# Patient Record
Sex: FEMALE | Race: WHITE | NOT HISPANIC OR LATINO | Employment: OTHER | URBAN - METROPOLITAN AREA
[De-identification: names, ages, dates, MRNs, and addresses within clinical notes are randomized per-mention and may not be internally consistent; named-entity substitution may affect disease eponyms.]

---

## 2017-01-09 ENCOUNTER — OFFICE VISIT (OUTPATIENT)
Dept: FAMILY MEDICINE | Facility: CLINIC | Age: 77
End: 2017-01-09
Payer: COMMERCIAL

## 2017-01-09 VITALS
TEMPERATURE: 98.2 F | DIASTOLIC BLOOD PRESSURE: 96 MMHG | HEART RATE: 86 BPM | OXYGEN SATURATION: 96 % | SYSTOLIC BLOOD PRESSURE: 150 MMHG

## 2017-01-09 DIAGNOSIS — F33.0 MAJOR DEPRESSIVE DISORDER, RECURRENT EPISODE, MILD (H): Primary | ICD-10-CM

## 2017-01-09 DIAGNOSIS — M25.50 PAIN IN JOINT, MULTIPLE SITES: ICD-10-CM

## 2017-01-09 LAB
CRP SERPL-MCNC: 6.1 MG/L (ref 0–8)
ERYTHROCYTE [SEDIMENTATION RATE] IN BLOOD BY WESTERGREN METHOD: 34 MM/H (ref 0–30)

## 2017-01-09 PROCEDURE — 86038 ANTINUCLEAR ANTIBODIES: CPT | Mod: 90 | Performed by: NURSE PRACTITIONER

## 2017-01-09 PROCEDURE — 99214 OFFICE O/P EST MOD 30 MIN: CPT | Performed by: NURSE PRACTITIONER

## 2017-01-09 PROCEDURE — 36415 COLL VENOUS BLD VENIPUNCTURE: CPT | Performed by: NURSE PRACTITIONER

## 2017-01-09 PROCEDURE — 86140 C-REACTIVE PROTEIN: CPT | Mod: 90 | Performed by: NURSE PRACTITIONER

## 2017-01-09 PROCEDURE — 85652 RBC SED RATE AUTOMATED: CPT | Performed by: NURSE PRACTITIONER

## 2017-01-09 PROCEDURE — 99000 SPECIMEN HANDLING OFFICE-LAB: CPT | Performed by: NURSE PRACTITIONER

## 2017-01-09 PROCEDURE — 86431 RHEUMATOID FACTOR QUANT: CPT | Mod: 90 | Performed by: NURSE PRACTITIONER

## 2017-01-09 ASSESSMENT — ANXIETY QUESTIONNAIRES
GAD7 TOTAL SCORE: 6
IF YOU CHECKED OFF ANY PROBLEMS ON THIS QUESTIONNAIRE, HOW DIFFICULT HAVE THESE PROBLEMS MADE IT FOR YOU TO DO YOUR WORK, TAKE CARE OF THINGS AT HOME, OR GET ALONG WITH OTHER PEOPLE: NOT DIFFICULT AT ALL
2. NOT BEING ABLE TO STOP OR CONTROL WORRYING: SEVERAL DAYS
1. FEELING NERVOUS, ANXIOUS, OR ON EDGE: MORE THAN HALF THE DAYS
6. BECOMING EASILY ANNOYED OR IRRITABLE: NOT AT ALL
3. WORRYING TOO MUCH ABOUT DIFFERENT THINGS: SEVERAL DAYS
7. FEELING AFRAID AS IF SOMETHING AWFUL MIGHT HAPPEN: SEVERAL DAYS
5. BEING SO RESTLESS THAT IT IS HARD TO SIT STILL: NOT AT ALL

## 2017-01-09 ASSESSMENT — PATIENT HEALTH QUESTIONNAIRE - PHQ9: 5. POOR APPETITE OR OVEREATING: SEVERAL DAYS
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
## 2017-01-09 NOTE — Clinical Note
[facility]   [address], [place]  [address]  [phone]      January 12, 2017      Roxana Karimi  [address]  [facility] [address]              Dear Ms. Karimi,    Tests for rheumatoid arthritis were negative (normal).    Results for orders placed or performed in visit on 01/09/17   Rheumatoid factor   Result Value Ref Range    Rheumatoid Factor <20 <20 IU/mL   Antinuclear antibody screen by EIA   Result Value Ref Range    LUIS MANUEL Screen by EIA <1.0  Interpretation:  Negative   <1.0   CRP inflammation   Result Value Ref Range    CRP Inflammation 6.1 0.0 - 8.0 mg/L   ESR: Erythrocyte sedimentation rate   Result Value Ref Range    Sed Rate 34 (H) 0 - 30 mm/h           Sincerely,    Gloria Akbar, DARON/nr

## 2017-01-09 NOTE — NURSING NOTE
"Chief Complaint   Patient presents with     Depression     Musculoskeletal Problem       Initial There were no vitals taken for this visit. Estimated body mass index is 36.15 kg/(m^2) as calculated from the following:    Height as of 7/13/14: 5' 3\" (1.6 m).    Weight as of 10/6/13: 204 lb (92.534 kg).  BP completed using cuff size: donnie Montoya CMA   "

## 2017-01-09 NOTE — MR AVS SNAPSHOT
"              After Visit Summary   2017    Roxana Karimi    MRN: [id number]           Patient Information     Date Of Birth          1940        Visit Information        Provider Department      2017 1:45 PM Gloria Akbar NP [facility]        Today's Diagnoses     Major depressive disorder, recurrent episode, mild (H)    -  1     Pain in joint, multiple sites            Follow-ups after your visit        Who to contact     If you have questions or need follow up information about today's clinic visit or your schedule please contact [facility] directly at [phone].  Normal or non-critical lab and imaging results will be communicated to you by [employer], letter or phone within 4 business days after the clinic has received the results. If you do not hear from us within 7 days, please contact the clinic through [employer] or phone. If you have a critical or abnormal lab result, we will notify you by phone as soon as possible.  Submit refill requests through [employer] or call your pharmacy and they will forward the refill request to us. Please allow 3 business days for your refill to be completed.          Additional Information About Your Visit        MyChart Information     [employer] lets you send messages to your doctor, view your test results, renew your prescriptions, schedule appointments and more. To sign up, go to [email or web address] . Click on \"Log in\" on the left side of the screen, which will take you to the Welcome page. Then click on \"Sign up Now\" on the right side of the page.     You will be asked to enter the access code listed below, as well as some personal information. Please follow the directions to create your username and password.     Your access code is: [email or web address]  Expires: 2017  2:53 PM     Your access code will  in 90 days. If you need help or a new code, please call your [facility] or [phone].        Care " EveryWhere ID     This is your Care EveryWhere ID. This could be used by other organizations to access your [place] medical records  [id number]        Your Vitals Were     Pulse Temperature Pulse Oximetry Breastfeeding?          86 98.2  F (36.8  C) (Oral) 96% No         Blood Pressure from Last 3 Encounters:   01/09/17 150/96   04/20/16 138/78   01/04/16 158/90    Weight from Last 3 Encounters:   10/06/13 204 lb (92.534 kg)   05/28/13 208 lb 3.2 oz (94.439 kg)   04/25/13 204 lb 3.2 oz (92.625 kg)              We Performed the Following     Antinuclear antibody screen by EIA     CRP inflammation     ESR: Erythrocyte sedimentation rate     Rheumatoid factor        Primary Care Provider Office Phone # Fax #    Gloria Akbar [phone]       [facility] [address]  [facility] [address]        Thank you!     Thank you for choosing [facility]  for your care. Our goal is always to provide you with excellent care. Hearing back from our patients is one way we can continue to improve our services. Please take a few minutes to complete the written survey that you may receive in the mail after your visit with us. Thank you!             Your Updated Medication List - Protect others around you: Learn how to safely use, store and throw away your medicines at www.disposemymeds.org.          This list is accurate as of: 1/9/17  2:53 PM.  Always use your most recent med list.                   Brand Name Dispense Instructions for use    aspirin 81 MG tablet      1 TABLET DAILY       GLUCOSAMINE CHONDROITIN COMPLX PO          IBUPROFEN PO      Take 400 mg by mouth every 4 hours as needed for moderate pain       magnesium 250 MG tablet      Take 1 tablet by mouth At Bedtime       OMEGA-3 FISH OIL PO      Take 1 g by mouth daily       order for DME     1 Units    Equipment being ordered: zipper Jobst or other compression stockings       TURMERIC PO          TYLENOL PO       Take by mouth as needed for mild pain or fever       vitamin B complex with vitamin C Tabs tablet      Take 1 tablet by mouth daily       VITAMIN D (CHOLECALCIFEROL) PO      Take 5,000 Units by mouth daily

## 2017-01-10 LAB
ANA SER QL IA: NORMAL
RHEUMATOID FACT SER NEPH-ACNC: <20 IU/ML (ref 0–20)

## 2017-01-10 ASSESSMENT — ANXIETY QUESTIONNAIRES: GAD7 TOTAL SCORE: 6

## 2017-01-10 ASSESSMENT — PATIENT HEALTH QUESTIONNAIRE - PHQ9: SUM OF ALL RESPONSES TO PHQ QUESTIONS 1-9: 13

## 2017-01-30 ENCOUNTER — TELEPHONE (OUTPATIENT)
Dept: FAMILY MEDICINE | Facility: CLINIC | Age: 77
End: 2017-01-30

## 2017-01-30 NOTE — TELEPHONE ENCOUNTER
I would recommend trying Delsym.  If this is not helping enough with the cough at night, she can let us know.

## 2017-01-30 NOTE — TELEPHONE ENCOUNTER
Reason for Call:  Medication or medication refill:    Do you use a [facility]?  Name of the pharmacy and phone number for the current request:  [facility] [address] - [phone]    Name of the medication requested: cough medicine    Other request: Pt requesting prescription for cough medicine.  She also mentioned that her  recently had respiratory issues so she wants to take what she can to prevent getting more sick.    Can we leave a detailed message on this number? YES    Phone number patient can be reached at: Home number on file [phone] (home)    Best Time: anytime    Call taken on 1/30/2017 at 1:31 PM by Castillo Cr

## 2017-01-30 NOTE — TELEPHONE ENCOUNTER
EDY review:  Patient is requesting an  RX to quiet her cough.  She has not tried the robitussin DM yet.  We reviewed home care for cough.  Told her I was not sure you would order RX with /codeine if she has not tried anything else.        Cough is dry with clear mucous. No fever or chills.  No SOB or other lung problems.  Spouse just had pneumonia.     Please give recommendation.

## 2017-03-29 ENCOUNTER — OFFICE VISIT (OUTPATIENT)
Dept: URGENT CARE | Facility: URGENT CARE | Age: 77
End: 2017-03-29
Payer: COMMERCIAL

## 2017-03-29 VITALS
TEMPERATURE: 98.9 F | SYSTOLIC BLOOD PRESSURE: 148 MMHG | WEIGHT: 190 LBS | BODY MASS INDEX: 33.66 KG/M2 | DIASTOLIC BLOOD PRESSURE: 79 MMHG | OXYGEN SATURATION: 98 % | HEART RATE: 85 BPM | HEIGHT: 63 IN

## 2017-03-29 DIAGNOSIS — J06.9 VIRAL URI WITH COUGH: Primary | ICD-10-CM

## 2017-03-29 DIAGNOSIS — R07.0 THROAT PAIN: ICD-10-CM

## 2017-03-29 LAB
DEPRECATED S PYO AG THROAT QL EIA: NORMAL
MICRO REPORT STATUS: NORMAL
SPECIMEN SOURCE: NORMAL

## 2017-03-29 PROCEDURE — 87081 CULTURE SCREEN ONLY: CPT | Performed by: INTERNAL MEDICINE

## 2017-03-29 PROCEDURE — 87880 STREP A ASSAY W/OPTIC: CPT | Performed by: INTERNAL MEDICINE

## 2017-03-29 PROCEDURE — 99213 OFFICE O/P EST LOW 20 MIN: CPT | Performed by: INTERNAL MEDICINE

## 2017-03-29 NOTE — MR AVS SNAPSHOT
"              After Visit Summary   3/29/2017    Roxana Karimi    MRN: [id number]           Patient Information     Date Of Birth          1940        Visit Information        Provider Department      3/29/2017 6:15 PM Jie Gay MD [facility]        Today's Diagnoses     Viral URI with cough    -  1    Throat pain          Care Instructions    Call or return to clinic if symptoms worsen or fail to improve as anticipated.          Follow-ups after your visit        Who to contact     If you have questions or need follow up information about today's clinic visit or your schedule please contact [facility] directly at [phone].  Normal or non-critical lab and imaging results will be communicated to you by MyChart, letter or phone within 4 business days after the clinic has received the results. If you do not hear from us within 7 days, please contact the clinic through MyChart or phone. If you have a critical or abnormal lab result, we will notify you by phone as soon as possible.  Submit refill requests through [employer] or call your pharmacy and they will forward the refill request to us. Please allow 3 business days for your refill to be completed.          Additional Information About Your Visit        MyChart Information     [employer] lets you send messages to your doctor, view your test results, renew your prescriptions, schedule appointments and more. To sign up, go to [email or web address] . Click on \"Log in\" on the left side of the screen, which will take you to the Welcome page. Then click on \"Sign up Now\" on the right side of the page.     You will be asked to enter the access code listed below, as well as some personal information. Please follow the directions to create your username and password.     Your access code is: [email or web address]  Expires: 2017  3:53 PM     Your access code will  in 90 days. If you need help or a new code, " "please call your [place] clinic or [phone].        Care EveryWhere ID     This is your Care EveryWhere ID. This could be used by other organizations to access your [place] medical records  [id number]        Your Vitals Were     Pulse Temperature Height Pulse Oximetry Breastfeeding? BMI (Body Mass Index)    85 98.9  F (37.2  C) (Tympanic) 5' 3\" (1.6 m) 98% No 33.66 kg/m2       Blood Pressure from Last 3 Encounters:   03/29/17 148/79   01/09/17 (!) 150/96   04/20/16 138/78    Weight from Last 3 Encounters:   03/29/17 190 lb (86.2 kg)   10/06/13 204 lb (92.5 kg)   05/28/13 208 lb 3.2 oz (94.4 kg)              We Performed the Following     Beta strep group A culture     Strep, Rapid Screen        Primary Care Provider Office Phone # Fax #    Gloria Akbar [phone]       [facility] [address] [facility] [address]  [facility] [address]        Thank you!     Thank you for choosing [facility]  for your care. Our goal is always to provide you with excellent care. Hearing back from our patients is one way we can continue to improve our services. Please take a few minutes to complete the written survey that you may receive in the mail after your visit with us. Thank you!             Your Updated Medication List - Protect others around you: Learn how to safely use, store and throw away your medicines at www.disposemymeds.org.          This list is accurate as of: 3/29/17  6:58 PM.  Always use your most recent med list.                   Brand Name Dispense Instructions for use    aspirin 81 MG tablet      1 TABLET DAILY       GLUCOSAMINE CHONDROITIN COMPLX PO          IBUPROFEN PO      Take 400 mg by mouth every 4 hours as needed for moderate pain       magnesium 250 MG tablet      Take 1 tablet by mouth At Bedtime       OMEGA-3 FISH OIL PO      Take 1 g by mouth daily       order for DME     1 Units    Equipment being ordered: zipper Jobst or other compression " stockings       TURMERIC PO          TYLENOL PO      Take by mouth as needed for mild pain or fever       vitamin B complex with vitamin C Tabs tablet      Take 1 tablet by mouth daily       VITAMIN D (CHOLECALCIFEROL) PO      Take 5,000 Units by mouth daily

## 2017-03-29 NOTE — PROGRESS NOTES
"SUBJECTIVE:   Roxana Karimi is a 77 year old female presenting with a chief complaint of   Chief Complaint   Patient presents with     Urgent Care     Nasal Congestion     c/o nasal congestion,cough and sore throat for 1 week     Patient comes in today with a runny nose cough, right ear pain and right-sided > left sore throat. She was concerned she had strep and wanted to get checked.  The mucus she produces from her nose and cough is clear. No fevers.  Course of illness is same.      Treatment measures tried include over-the-counter cold medicines(.  Predisposing factors include None.    Past Medical History:   Diagnosis Date     Iritis      Current Outpatient Prescriptions   Medication Sig Dispense Refill     TURMERIC PO        Acetaminophen (TYLENOL PO) Take by mouth as needed for mild pain or fever       GLUCOSAMINE CHONDROITIN COMPLX PO        magnesium 250 MG tablet Take 1 tablet by mouth At Bedtime       order for DME Equipment being ordered: zipper Jobst or other compression stockings 1 Units 0     Omega-3 Fatty Acids (OMEGA-3 FISH OIL PO) Take 1 g by mouth daily       IBUPROFEN PO Take 400 mg by mouth every 4 hours as needed for moderate pain       VITAMIN D, CHOLECALCIFEROL, PO Take 5,000 Units by mouth daily       vitamin  B complex with vitamin C (VITAMIN  B COMPLEX) TABS Take 1 tablet by mouth daily       ASPIRIN 81 MG OR TABS 1 TABLET DAILY       Social History   Substance Use Topics     Smoking status: Never Smoker     Smokeless tobacco: Never Used     Alcohol use No       OBJECTIVE  :/79 (BP Location: Left arm, Patient Position: Chair, Cuff Size: Adult Regular)  Pulse 85  Temp 98.9  F (37.2  C) (Tympanic)  Ht 5' 3\" (1.6 m)  Wt 190 lb (86.2 kg)  SpO2 98%  Breastfeeding? No  BMI 33.66 kg/m2  GENERAL APPEARANCE: healthy, alert and no distress  HENT: ear canals and TM's normal.  Nose and mouth without ulcers, erythema or lesions  Postnasal drip  NECK: supple, nontender, no " lymphadenopathy  RESP: lungs clear to auscultation - no rales, rhonchi or wheezes  CV: regular rates and rhythm, normal S1 S2, no murmur noted      ASSESSMENT:    ICD-10-CM    1. Viral URI with cough J06.9     B97.89    2. Throat pain R07.0 Strep, Rapid Screen     Beta strep group A culture         PLAN:  saline nasal spray and cough drops  See orders in Epic

## 2017-03-29 NOTE — NURSING NOTE
"Chief Complaint   Patient presents with     Urgent Care     Nasal Congestion     c/o nasal congestion,cough and sore throat for 1 week       Initial /79 (BP Location: Left arm, Patient Position: Chair, Cuff Size: Adult Regular)  Pulse 85  Temp 98.9  F (37.2  C) (Tympanic)  Ht 5' 3\" (1.6 m)  Wt 190 lb (86.2 kg)  SpO2 98%  Breastfeeding? No  BMI 33.66 kg/m2 Estimated body mass index is 33.66 kg/(m^2) as calculated from the following:    Height as of this encounter: 5' 3\" (1.6 m).    Weight as of this encounter: 190 lb (86.2 kg).  Medication Reconciliation: complete   Yasemin hC MA    "

## 2017-03-31 LAB
BACTERIA SPEC CULT: NORMAL
MICRO REPORT STATUS: NORMAL
SPECIMEN SOURCE: NORMAL

## 2017-06-14 ENCOUNTER — TRANSFERRED RECORDS (OUTPATIENT)
Dept: HEALTH INFORMATION MANAGEMENT | Facility: CLINIC | Age: 77
End: 2017-06-14

## 2017-07-27 ENCOUNTER — TELEPHONE (OUTPATIENT)
Dept: FAMILY MEDICINE | Facility: CLINIC | Age: 77
End: 2017-07-27

## 2017-07-27 NOTE — LETTER
July 27, 2017        Roxana Karimi  [address] [facility] [address]              Dear Roxana,       We noted that it is time for your 6 month depression follow up. We are just attempting to reach you, to touch base to see how you are doing. We want to give you the best care so we are just checking in to see that your medication(s) are controlling your symptoms. If you could please complete the attached questionnaire and send it back to us it would be much appreciated. To schedule an office visit or if you have any questions, please call the clinic at [phone].         Sincerely,         Roxann GRUBER MA/Gloria Akbar's office

## 2017-07-27 NOTE — TELEPHONE ENCOUNTER
Type of outreach:  Sent letter.  Health Maintenance Due   Topic Date Due     FALL RISK ASSESSMENT  04/20/2017     PNEUMOCOCCAL (2 of 2 - PPSV23) 04/20/2017     DEPRESSION ACTION PLAN Q1 YR  04/20/2017     PHQ-9 Q6 MONTHS  07/09/2017     Roxann Hitchcock MA

## 2017-12-11 ENCOUNTER — TELEPHONE (OUTPATIENT)
Dept: FAMILY MEDICINE | Facility: CLINIC | Age: 77
End: 2017-12-11

## 2017-12-11 DIAGNOSIS — N63.0 LUMP OR MASS IN BREAST: Primary | ICD-10-CM

## 2017-12-11 NOTE — TELEPHONE ENCOUNTER
Reason for Call: Request for an order or referral:    Order or referral being requested: BI LATERAL DIAGNOSTIC MAMMOGRAM & LEFT BREAST ULTRA SOUND    Date needed: as soon as possible    Has the patient been seen by the PCP for this problem? YES    Additional comments: CALLER: JACKELYN [phone] FROM [facility]    Phone number Patient can be reached at:  Home number on file [phone] (home)    Best Time:  ANY TIME    Can we leave a detailed message on this number?  YES    Call taken on 12/11/2017 at 1:48 PM by Rosita Nogueira

## 2017-12-11 NOTE — TELEPHONE ENCOUNTER
Verbal order given for below  dod- please sign off on these for pt    Thanks!     Tabatha Randolph RN

## 2017-12-12 NOTE — TELEPHONE ENCOUNTER
EDY review:  It appears these orders were not signed off yet by DOD.  Can you review for sign off of additional mammo views?

## 2017-12-14 ENCOUNTER — OFFICE VISIT (OUTPATIENT)
Dept: FAMILY MEDICINE | Facility: CLINIC | Age: 77
End: 2017-12-14
Payer: COMMERCIAL

## 2017-12-14 VITALS
OXYGEN SATURATION: 100 % | DIASTOLIC BLOOD PRESSURE: 76 MMHG | SYSTOLIC BLOOD PRESSURE: 139 MMHG | TEMPERATURE: 97.9 F | RESPIRATION RATE: 18 BRPM | HEART RATE: 74 BPM

## 2017-12-14 DIAGNOSIS — N63.0 LUMP OR MASS IN BREAST: Primary | ICD-10-CM

## 2017-12-14 PROCEDURE — 99213 OFFICE O/P EST LOW 20 MIN: CPT | Performed by: NURSE PRACTITIONER

## 2017-12-14 ASSESSMENT — PATIENT HEALTH QUESTIONNAIRE - PHQ9: SUM OF ALL RESPONSES TO PHQ QUESTIONS 1-9: 2

## 2017-12-14 NOTE — PROGRESS NOTES
SUBJECTIVE:   Roxana Karimi is a 77 year old female who presents to clinic today for the following health issues:    Lump in Left Breast  Itching breast and felt lump on Saturday   Had referral for mammogram last April. Pt went [facility]   3-D and ultrasound    Pt concerned about insurance covering $1,000 to get testing completed     No pain, rash, nipple changes,  Her mother was diagnosed with breast cancer in her 60s.                Problem list and histories reviewed & adjusted, as indicated.  Additional history: as documented        Reviewed and updated as needed this visit by clinical staff  Allergies  Meds       Reviewed and updated as needed this visit by Provider         ROS:  C: NEGATIVE for fever, chills, change in weight  INTEGUMENTARY/SKIN: NEGATIVE for worrisome rashes, moles or lesions  R: NEGATIVE for significant cough or SOB  BREAST: see HPI  CV: NEGATIVE for chest pain, palpitations or peripheral edema  GI: NEGATIVE for nausea, abdominal pain, heartburn, or change in bowel habits    OBJECTIVE:     /76  Pulse 74  Temp 97.9  F (36.6  C) (Oral)  Resp 18  SpO2 100%  There is no height or weight on file to calculate BMI.  GENERAL: healthy, alert and no distress  RESP: lungs clear to auscultation - no rales, rhonchi or wheezes  BREAST: no palpable axillary masses or adenopathy; approximately 1 cm in diameter cystic nodule at the 11:00 position of the left breast  CV: regular rate and rhythm, normal S1 S2, no S3 or S4, no murmur, click or rub, no peripheral edema and peripheral pulses strong        ASSESSMENT/PLAN:             1. Lump or mass in breast  Discussing checking with her insurance company about coverage since she is concerned about cost.  She will schedule the diagnostic mammogram and ultrasound.            Gloria Akbar NP  [facility]

## 2017-12-14 NOTE — MR AVS SNAPSHOT
"              After Visit Summary   12/14/2017    Roxana Karimi    MRN: [id number]           Patient Information     Date Of Birth          1940        Visit Information        Provider Department      12/14/2017 9:00 AM Gloira Akbar NP [facility]        Today's Diagnoses     Lump or mass in breast    -  1       Follow-ups after your visit        Your next 10 appointments already scheduled     Dec 18, 2017 10:30 AM CST   (Arrive by 10:15 AM)   MA DIAGNOSTIC DIGITAL BILATERAL with [id number]   [facility] ([facility])    [address] [place] [address], [address]  [facility] [address] [phone]           Do not use any powder, lotion or deodorant under your arms or on your breast. If you do, we will ask you to remove it before your exam.  Wear comfortable, two-piece clothing.  If you have any allergies, tell your care team.  Bring any previous mammograms from other facilities or have them mailed to the breast center.  Three-dimensional (3D) mammograms are available at [place] locations in [facility], [facility], [facility], and [place]. [facility] locations include [place] and [facility] in [place]. Benefits of 3D mammograms include: - Improved rate of cancer detection - Decreases your chance of having to go back for more tests, which means fewer: - \"False-positive\" results (This means that there is an abnormal area but it isn't cancer.) - Invasive testing procedures, such as a biopsy or surgery - Can provide clearer images of the breast if you have dense breast tissue. 3D mammography is an optional exam that anyone can have with a 2D mammogram. It doesn't replace or take the place of a 2D mammogram. 2D mammograms remain an effective screening test for all women.  Not all insurance companies cover the cost of a 3D mammogram. Check with your insurance.            Dec 18, 2017 11:00 AM CST   US " "BREAST LEFT LIMITED 1-3 QUAD with SHBCUS1   [facility] ([facility])    [address] [place] [address], [address] [place] [address] [phone]           Please bring a list of your medicines (including vitamins, minerals and over-the-counter drugs). Also, tell your doctor about any allergies you may have. Wear comfortable clothes and leave your valuables at home.  You do not need to do anything special to prepare for your exam.  Please call the Imaging Department at your exam site with any questions.              Who to contact     If you have questions or need follow up information about today's clinic visit or your schedule please contact [facility] directly at [phone].  Normal or non-critical lab and imaging results will be communicated to you by MyChart, letter or phone within 4 business days after the clinic has received the results. If you do not hear from us within 7 days, please contact the clinic through [employer] or phone. If you have a critical or abnormal lab result, we will notify you by phone as soon as possible.  Submit refill requests through [employer] or call your pharmacy and they will forward the refill request to us. Please allow 3 business days for your refill to be completed.          Additional Information About Your Visit        [employer] Information     [employer] lets you send messages to your doctor, view your test results, renew your prescriptions, schedule appointments and more. To sign up, go to [email or web address] . Click on \"Log in\" on the left side of the screen, which will take you to the Welcome page. Then click on \"Sign up Now\" on the right side of the page.     You will be asked to enter the access code listed below, as well as some personal information. Please follow the directions to create your username and password.     Your access code is: [email or web address]  Expires: 3/14/2018 12:28 PM     Your access code will  in " 90 days. If you need help or a new code, please call your [facility] clinic or [phone].        Care EveryWhere ID     This is your Care EveryWhere ID. This could be used by other organizations to access your [facility] medical records  [id number]        Your Vitals Were     Pulse Temperature Respirations Pulse Oximetry          74 97.9  F (36.6  C) (Oral) 18 100%         Blood Pressure from Last 3 Encounters:   12/14/17 139/76   03/29/17 148/79   01/09/17 (!) 150/96    Weight from Last 3 Encounters:   03/29/17 190 lb (86.2 kg)   10/06/13 204 lb (92.5 kg)   05/28/13 208 lb 3.2 oz (94.4 kg)              We Performed the Following     DEPRESSION ACTION PLAN (DAP)        Primary Care Provider Office Phone # Fax #    Gloria Akbar [phone]       [address] [facility] [address]        Equal Access to Services     [facility] DAVID : Hadii aad ku hadasho Soomaali, waaxda luqadaha, qaybta kaalmada adeegyada, waxay idiin hayaan alonzoeg silvia schaefer . So [facility] [phone].    ATENCIÓN: Si habla español, tiene a ayon disposición servicios gratuitos de asistencia lingüística. Llame al [phone].    We comply with applicable federal civil rights laws and [place] laws. We do not discriminate on the basis of race, color, national origin, age, disability, sex, sexual orientation, or gender identity.            Thank you!     Thank you for choosing [facility]  for your care. Our goal is always to provide you with excellent care. Hearing back from our patients is one way we can continue to improve our services. Please take a few minutes to complete the written survey that you may receive in the mail after your visit with us. Thank you!             Your Updated Medication List - Protect others around you: Learn how to safely use, store and throw away your medicines at www.disposemymeds.org.          This list is accurate as of: 12/14/17 12:28 PM.  Always use your most recent med  list.                   Brand Name Dispense Instructions for use Diagnosis    aspirin 81 MG tablet      1 TABLET DAILY        GLUCOSAMINE CHONDROITIN COMPLX PO           magnesium 250 MG tablet      Take 1 tablet by mouth nightly as needed        OMEGA-3 FISH OIL PO      Take 1 g by mouth daily Relief factor    Pain in joint of right pelvic region or thigh       order for DME     1 Units    Equipment being ordered: zipper Jobst or other compression stockings    Varicose veins       TURMERIC PO           TYLENOL PO      Take by mouth as needed for mild pain or fever        vitamin B complex with vitamin C Tabs tablet      Take 1 tablet by mouth daily        VITAMIN D (CHOLECALCIFEROL) PO      Take 5,000 Units by mouth daily        VITAMIN E PO

## 2017-12-14 NOTE — LETTER
My Depression Action Plan  Name: Roxana Karimi   Date of Birth 1940  Date: 12/14/2017    My doctor: Gloria Akbra   My clinic: [address] [id number] [phone]          GREEN    ZONE   Good Control    What it looks like:     Things are going generally well. You have normal up s and down s. You may even feel depressed from time to time, but bad moods usually last less than a day.   What you need to do:  1. Continue to care for yourself (see self care plan)  2. Check your depression survival kit and update it as needed  3. Follow your physician s recommendations including any medication.  4. Do not stop taking medication unless you consult with your physician first.           YELLOW         ZONE Getting Worse    What it looks like:     Depression is starting to interfere with your life.     It may be hard to get out of bed; you may be starting to isolate yourself from others.    Symptoms of depression are starting to last most all day and this has happened for several days.     You may have suicidal thoughts but they are not constant.   What you need to do:     1. Call your care team, your response to treatment will improve if you keep your care team informed of your progress. Yellow periods are signs an adjustment may need to be made.     2. Continue your self-care, even if you have to fake it!    3. Talk to someone in your support network    4. Open up your depression survival kit           RED    ZONE Medical Alert - Get Help    What it looks like:     Depression is seriously interfering with your life.     You may experience these or other symptoms: You can t get out of bed most days, can t work or engage in other necessary activities, you have trouble taking care of basic hygiene, or basic responsibilities, thoughts of suicide or death that will not go away, self-injurious behavior.     What you need to do:  1. Call your care team  and request a same-day appointment. If they are not available (weekends or after hours) call your local crisis line, emergency room or 911.      Electronically signed by: Roxann Hitchcock, December 14, 2017    Depression Self Care Plan / Survival Kit    Self-Care for Depression  Here s the deal. Your body and mind are really not as separate as most people think.  What you do and think affects how you feel and how you feel influences what you do and think. This means if you do things that people who feel good do, it will help you feel better.  Sometimes this is all it takes.  There is also a place for medication and therapy depending on how severe your depression is, so be sure to consult with your medical provider and/ or Behavioral Health Consultant if your symptoms are worsening or not improving.     In order to better manage my stress, I will:    Exercise  Get some form of exercise, every day. This will help reduce pain and release endorphins, the  feel good  chemicals in your brain. This is almost as good as taking antidepressants!  This is not the same as joining a gym and then never going! (they count on that by the way ) It can be as simple as just going for a walk or doing some gardening, anything that will get you moving.      Hygiene   Maintain good hygiene (Get out of bed in the morning, Make your bed, Brush your teeth, Take a shower, and Get dressed like you were going to work, even if you are unemployed).  If your clothes don't fit try to get ones that do.    Diet  I will strive to eat foods that are good for me, drink plenty of water, and avoid excessive sugar, caffeine, alcohol, and other mood-altering substances.  Some foods that are helpful in depression are: complex carbohydrates, B vitamins, flaxseed, fish or fish oil, fresh fruits and vegetables.    Psychotherapy  I agree to participate in Individual Therapy (if recommended).    Medication  If prescribed medications, I agree to take them.  Missing  doses can result in serious side effects.  I understand that drinking alcohol, or other illicit drug use, may cause potential side effects.  I will not stop my medication abruptly without first discussing it with my provider.    Staying Connected With Others  I will stay in touch with my friends, family members, and my primary care provider/team.    Use your imagination  Be creative.  We all have a creative side; it doesn t matter if it s oil painting, sand castles, or mud pies! This will also kick up the endorphins.    Witness Beauty  (AKA stop and smell the roses) Take a look outside, even in mid-winter. Notice colors, textures. Watch the squirrels and birds.     Service to others  Be of service to others.  There is always someone else in need.  By helping others we can  get out of ourselves  and remember the really important things.  This also provides opportunities for practicing all the other parts of the program.    Humor  Laugh and be silly!  Adjust your TV habits for less news and crime-drama and more comedy.    Control your stress  Try breathing deep, massage therapy, biofeedback, and meditation. Find time to relax each day.     My support system    Clinic Contact:  Phone number:    Contact 1:  Phone number:    Contact 2:  Phone number:    Anglican/:  Phone number:    Therapist:  Phone number:    Local crisis center:    Phone number:    Other community support:  Phone number:

## 2017-12-18 ENCOUNTER — HOSPITAL ENCOUNTER (OUTPATIENT)
Dept: MAMMOGRAPHY | Facility: CLINIC | Age: 77
Discharge: HOME | End: 2017-12-18
Attending: NURSE PRACTITIONER | Admitting: NURSE PRACTITIONER
Payer: MEDICARE

## 2017-12-18 ENCOUNTER — HOSPITAL ENCOUNTER (OUTPATIENT)
Dept: MAMMOGRAPHY | Facility: CLINIC | Age: 77
End: 2017-12-18
Attending: NURSE PRACTITIONER
Payer: MEDICARE

## 2017-12-18 DIAGNOSIS — N63.22 MASS OF UPPER INNER QUADRANT OF LEFT BREAST: ICD-10-CM

## 2017-12-18 DIAGNOSIS — N63.0 LUMP OR MASS IN BREAST: ICD-10-CM

## 2017-12-18 DIAGNOSIS — N63.0 BREAST LUMP IN UPPER INNER QUADRANT: ICD-10-CM

## 2017-12-18 PROCEDURE — 76642 ULTRASOUND BREAST LIMITED: CPT | Mod: LT

## 2017-12-18 PROCEDURE — G0279 TOMOSYNTHESIS, MAMMO: HCPCS

## 2018-03-22 ENCOUNTER — HOSPITAL ENCOUNTER (OUTPATIENT)
Dept: MAMMOGRAPHY | Facility: CLINIC | Age: 78
Discharge: HOME | End: 2018-03-22
Attending: NURSE PRACTITIONER | Admitting: NURSE PRACTITIONER
Payer: MEDICARE

## 2018-03-22 DIAGNOSIS — Z09 FOLLOW-UP EXAM, 3-6 MONTHS SINCE PREVIOUS EXAM: ICD-10-CM

## 2018-03-22 DIAGNOSIS — N63.21 MASS OF UPPER OUTER QUADRANT OF LEFT BREAST: ICD-10-CM

## 2018-03-22 PROCEDURE — 76642 ULTRASOUND BREAST LIMITED: CPT | Mod: LT

## 2019-02-27 ENCOUNTER — OFFICE VISIT (OUTPATIENT)
Dept: FAMILY MEDICINE | Facility: CLINIC | Age: 79
End: 2019-02-27
Payer: COMMERCIAL

## 2019-02-27 VITALS
TEMPERATURE: 97.6 F | DIASTOLIC BLOOD PRESSURE: 80 MMHG | HEIGHT: 63 IN | OXYGEN SATURATION: 100 % | SYSTOLIC BLOOD PRESSURE: 162 MMHG | RESPIRATION RATE: 16 BRPM | BODY MASS INDEX: 33.66 KG/M2 | HEART RATE: 87 BPM

## 2019-02-27 DIAGNOSIS — M25.511 ACUTE PAIN OF RIGHT SHOULDER: Primary | ICD-10-CM

## 2019-02-27 DIAGNOSIS — H91.93: ICD-10-CM

## 2019-02-27 DIAGNOSIS — M75.51 ACUTE BURSITIS OF RIGHT SHOULDER: ICD-10-CM

## 2019-02-27 PROCEDURE — 20610 DRAIN/INJ JOINT/BURSA W/O US: CPT | Performed by: FAMILY MEDICINE

## 2019-02-27 PROCEDURE — 99213 OFFICE O/P EST LOW 20 MIN: CPT | Mod: 25 | Performed by: FAMILY MEDICINE

## 2019-02-27 RX ORDER — TRIAMCINOLONE ACETONIDE 40 MG/ML
40 INJECTION, SUSPENSION INTRA-ARTICULAR; INTRAMUSCULAR ONCE
Status: COMPLETED | OUTPATIENT
Start: 2019-02-27 | End: 2019-02-27

## 2019-02-27 RX ADMIN — TRIAMCINOLONE ACETONIDE 40 MG: 40 INJECTION, SUSPENSION INTRA-ARTICULAR; INTRAMUSCULAR at 13:12

## 2019-02-27 ASSESSMENT — PATIENT HEALTH QUESTIONNAIRE - PHQ9: SUM OF ALL RESPONSES TO PHQ QUESTIONS 1-9: 2

## 2019-02-27 NOTE — PROGRESS NOTES
SUBJECTIVE:   Roxana Karimi is a 79 year old female who presents to clinic today for the following health issues:    Musculoskeletal problem/pain      Duration: 1. 5 weeks     Description  Location: Right shoulder pain, hard to raise arm up     Intensity:  severe    Accompanying signs and symptoms: none    History  Previous similar problem: no   Previous evaluation:  none    Precipitating or alleviating factors:  Trauma or overuse: YES- overuse   Aggravating factors include: overuse and house work     Therapies tried and outcome: ice and heat     Additional concerns: Check ears      1.  Right shoulder pain: this started about a week and a half ago and seemed to be brought on after shoveling the snow.  No specific injury other than overuse.  Now painful to move her shoulder alexandra above 90 degrees.  No numbness, tingling or weakness.  Pain is mainly over the anterior and tip of the shoulder as well as in the deltoid/tricep.  Taking 400mg of ibuprofen nearly eradicates her pain; however, it is only temporarily helpful, and she is concerned about using it too much.  Her  has some early stage dementia,so she has been having to do more around the house than usual.     2. ?hearing problem: her  tells her that her hearing is getting worse.  However, she notes that he calls to her from the next room with the TV blasting which is why she has a hard time hearing him.  No ear pain, vertigo, discharge, or nasal congestion, fever or URI symptoms. She would like me to check for wax.        Problem list and histories reviewed & adjusted, as indicated.  Additional history: as documented    Patient Active Problem List   Diagnosis     CARDIOVASCULAR SCREENING; LDL GOAL LESS THAN 160     Advanced directives, counseling/discussion     Mild major depression (H)     Vitamin D deficiency     Obesity     Shingles     Preseptal cellulitis     Renal insufficiency     Bilateral shoulder pain     Past Surgical History:  "  Procedure Laterality Date     NO HISTORY OF SURGERY         Social History     Tobacco Use     Smoking status: Never Smoker     Smokeless tobacco: Never Used   Substance Use Topics     Alcohol use: No     Family History   Problem Relation Age of Onset     Breast Cancer Mother      Blood Disease Mother         bone marrow depression-bone wouldnt produce hgb     C.A.D. Father      Hypertension Father      Respiratory Maternal Grandfather         asthma     Respiratory Daughter         asthma     Diabetes No family hx of      Cerebrovascular Disease No family hx of      Cancer - colorectal No family hx of          Current Outpatient Medications   Medication Sig Dispense Refill     Acetaminophen (TYLENOL PO) Take by mouth as needed for mild pain or fever       ASPIRIN 81 MG OR TABS 1 TABLET DAILY       GLUCOSAMINE CHONDROITIN COMPLX PO        magnesium 250 MG tablet Take 1 tablet by mouth nightly as needed        Omega-3 Fatty Acids (OMEGA-3 FISH OIL PO) Take 1 g by mouth daily Relief factor       order for DME Equipment being ordered: zipper Jobst or other compression stockings 1 Units 0     TURMERIC PO        vitamin  B complex with vitamin C (VITAMIN  B COMPLEX) TABS Take 1 tablet by mouth daily       VITAMIN D, CHOLECALCIFEROL, PO Take 5,000 Units by mouth daily       VITAMIN E PO        Allergies   Allergen Reactions     Codeine Sulfate      hives, swelling     No Known Drug Allergies        Reviewed and updated as needed this visit by clinical staff       Reviewed and updated as needed this visit by Provider         ROS:  Constitutional, HEENT, cardiovascular, pulmonary, gi and gu systems are negative, except as otherwise noted.    OBJECTIVE:     /80   Pulse 87   Temp 97.6  F (36.4  C) (Oral)   Resp 16   Ht 1.6 m (5' 3\")   SpO2 100%   Breastfeeding? No   BMI 33.66 kg/m    Body mass index is 33.66 kg/m .  GENERAL APPEARANCE: healthy, alert and no distress  EYES: Eyes grossly normal to inspection, " PERRL and conjunctivae and sclerae normal  HENT: ear canals and TM's normal and nose and mouth without ulcers or lesions  NECK: no adenopathy, no asymmetry, masses, or scars and thyroid normal to palpation  RESP: lungs clear to auscultation - no rales, rhonchi or wheezes  CV: regular rates and rhythm, normal S1 S2, no S3 or S4 and no murmur, click or rub  MS: Right shoulder: no obvious deformity, swelling or erythema.  Active ROM is limited with flexion to 60 degrees, abduction to 90 degrees.  Normal external rotation and very limited internal rotation.  Passive flexion can be done to 90 degrees, passive abduction to 100 degrees.  Positive scarf sign, Stout-Mayo.  Negative empty can test.    PSYCH: mentation appears normal and affect normal/bright        ASSESSMENT/PLAN:             1. Acute pain of right shoulder  Most likely due to bursitis/impingement and/or rotator tendonopathy.  No evidence of weakness to indicate a rotator tear.    - [facility]FERR[facility]; Future  - triamcinolone (KENALOG-40) injection 40 mg    2. Acute bursitis of right shoulder  I discussed treatment options, including no treatment, over the counter pain relievers, home exercises, physical therapy and cortisone injection.  The patient, who has had good results with a knee and hip cortisone injection in the past, would like to proceed with cortisone injection.  I discussed possible side effects such as bleeding, infection, being ineffective, tendon damage with repeated injections, and flare up of pain for the first day.  I advised ice and rest tonight and to rest the shoulder this week.  She may do gentle ROM and home stretches, then start physical therapy if needed next week.    20mg in 4.5mL of lidocaine 1% were injected via the posterior approach into the subacromial bursa.  There were no complications.  The patient tolerated the procedure well.    The patient is making arrangements to have neighbors and friends help  her with clearing away snow and other similar tasks.  - triamcinolone (KENALOG-40) injection 40 mg  - DRAIN/INJECT LARGE JOINT/BURSA    3. Hearing-related symptom of both ears  Reassurance that there is no cerumen impaction or any other visible problem with the ears.            Sudha Godwin MD  [facility]

## 2019-03-07 ENCOUNTER — THERAPY VISIT (OUTPATIENT)
Dept: PHYSICAL THERAPY | Facility: CLINIC | Age: 79
End: 2019-03-07
Attending: FAMILY MEDICINE
Payer: COMMERCIAL

## 2019-03-07 DIAGNOSIS — M25.511 ACUTE PAIN OF RIGHT SHOULDER: ICD-10-CM

## 2019-03-07 DIAGNOSIS — M25.511 SHOULDER PAIN, RIGHT: ICD-10-CM

## 2019-03-07 PROCEDURE — G8984 CARRY CURRENT STATUS: HCPCS | Mod: GP | Performed by: PHYSICAL THERAPIST

## 2019-03-07 PROCEDURE — 97110 THERAPEUTIC EXERCISES: CPT | Mod: GP | Performed by: PHYSICAL THERAPIST

## 2019-03-07 PROCEDURE — 97161 PT EVAL LOW COMPLEX 20 MIN: CPT | Mod: GP | Performed by: PHYSICAL THERAPIST

## 2019-03-07 PROCEDURE — G8985 CARRY GOAL STATUS: HCPCS | Mod: GP | Performed by: PHYSICAL THERAPIST

## 2019-03-07 NOTE — PROGRESS NOTES
[facility] Initial Evaluation  Subjective:  The history is provided by the patient.   Roxana Karimi is a 79 year old female with a right shoulder condition.  Condition occurred with:  Repetition/overuse.  Condition occurred: at home.  This is a new condition  Date of orders 2/27/19; started 2 wks ago, noted inc R arm pain. Reports she has been shoveling more this season. Notes weakness as well. Had a cortisone shot 2/27 which was helpful.      Social: Primary , care taker, shoveling. L hand dominant.  .    Patient reports pain:  Upper arm and lateral.    Pain is described as aching and is intermittent and reported as 6/10.  Associated symptoms:  Loss of motion/stiffness and loss of strength. Pain is worse in the P.M. (activity dependent).  Symptoms are exacerbated by carrying, lifting, using arm at shoulder level, using arm behind back and using arm overhead and relieved by ice, heat and NSAID's.  Since onset symptoms are gradually improving.        General health as reported by patient is good.                                              Objective:  Standing Alignment:    Cervical/Thoracic:  Forward head  Shoulder/UE:  Rounded shoulders, elevated scapula R and elevated scapula L (holds R arm flexed, guarded to body)                                       Shoulder Evaluation:  ROM:  AROM:    Flexion:  Left:  110    Right:  110; bias to scaption    Abduction:  Left: 120   Right:  90 empty end feel ++ pain      External Rotation:  Left:  45    Right:  35            Extension/Internal Rotation:  Left:  L3    Right:  L3          Strength:  : R shoulder 3/5 flex, abd, and ER (ER too painful to hold), IR 5-5.  L shoulder 4/5 gross planes of motion.                      Stability Testing:  normal      Special Tests:      Right shoulder positive for the following special tests:Impingement  Right shoulder negative for the following special tests:Rotator cuff tear  Palpation:      Right  shoulder tenderness present at: Acrimioclavicular; Deltoid and Upper Trap  Mobility Tests:            Scapulothoracic right:  Hypomobile    Scapulohumeral rhythm right:  Hypomobile                                   General     ROS    Assessment/Plan:    Patient is a 79 year old female with right side shoulder complaints.    Patient has the following significant findings with corresponding treatment plan.                Diagnosis 1:  R shoulder pain  Pain -  hot/cold therapy, manual therapy, splint/taping/bracing/orthotics, self management, education, directional preference exercise and home program  Decreased ROM/flexibility - manual therapy, therapeutic exercise and home program  Decreased joint mobility - manual therapy, therapeutic exercise and home program  Decreased strength - therapeutic exercise, therapeutic activities and home program  Decreased proprioception - neuro re-education, therapeutic activities and home program  Impaired posture - neuro re-education, therapeutic activities and home program    Therapy Evaluation Codes:   Cumulative Therapy Evaluation is: Low complexity.    Previous and current functional limitations:  (See Goal Flow Sheet for this information)    Short term and Long term goals: (See Goal Flow Sheet for this information)     Communication ability:  Patient appears to be able to clearly communicate and understand verbal and written communication and follow directions correctly.  Treatment Explanation - The following has been discussed with the patient:   RX ordered/plan of care  Anticipated outcomes  Possible risks and side effects  This patient would benefit from PT intervention to resume normal activities.   Rehab potential is good.    Frequency:  1 X week, once daily  Duration:  for 4 weeks tapering to 2 X a month over 6 weeks  Discharge Plan:  Achieve all LTG.  Independent in home treatment program.  Reach maximal therapeutic benefit.    Please refer to the daily flowsheet for  treatment today, total treatment time and time spent performing 1:1 timed codes.

## 2019-03-21 ENCOUNTER — THERAPY VISIT (OUTPATIENT)
Dept: PHYSICAL THERAPY | Facility: CLINIC | Age: 79
End: 2019-03-21
Payer: COMMERCIAL

## 2019-03-21 DIAGNOSIS — M25.511 SHOULDER PAIN, RIGHT: ICD-10-CM

## 2019-03-21 PROCEDURE — 97112 NEUROMUSCULAR REEDUCATION: CPT | Mod: GP | Performed by: PHYSICAL THERAPIST

## 2019-03-21 PROCEDURE — 97110 THERAPEUTIC EXERCISES: CPT | Mod: GP | Performed by: PHYSICAL THERAPIST

## 2019-03-28 ENCOUNTER — THERAPY VISIT (OUTPATIENT)
Dept: PHYSICAL THERAPY | Facility: CLINIC | Age: 79
End: 2019-03-28
Payer: COMMERCIAL

## 2019-03-28 DIAGNOSIS — M25.511 SHOULDER PAIN, RIGHT: ICD-10-CM

## 2019-03-28 PROCEDURE — 97110 THERAPEUTIC EXERCISES: CPT | Mod: GP | Performed by: PHYSICAL THERAPIST

## 2019-04-18 ENCOUNTER — THERAPY VISIT (OUTPATIENT)
Dept: PHYSICAL THERAPY | Facility: CLINIC | Age: 79
End: 2019-04-18
Payer: COMMERCIAL

## 2019-04-18 DIAGNOSIS — M25.511 SHOULDER PAIN, RIGHT: ICD-10-CM

## 2019-04-18 PROCEDURE — 97110 THERAPEUTIC EXERCISES: CPT | Mod: GP | Performed by: PHYSICAL THERAPIST

## 2019-04-18 PROCEDURE — 97112 NEUROMUSCULAR REEDUCATION: CPT | Mod: GP | Performed by: PHYSICAL THERAPIST

## 2019-04-18 NOTE — PROGRESS NOTES
"Subjective:  HPI                    Objective:  System    Physical Exam    General     ROS    Assessment/Plan:    DISCHARGE REPORT    Progress reporting period is from 3/7/19 to 4/18/19.     SUBJECTIVE  Subjective: Was \"nearly perfect\" until yesterday when she was a passenger, had to reach up and back. Had pain and had pain last night. Overall significant improvement   Current Pain level: 0/10            ;   ,     The objective findings are from DOS 4/18/19.    OBJECTIVE  Objective: Comparable AROM, 4/5 gross planes of motion.      ASSESSMENT/PLAN  Updated problem list and treatment plan: Diagnosis 1:  R shoulder pain  Decreased ROM/flexibility - home program  Decreased strength - home program  STG/LTGs have been met or progress has been made towards goals:  Yes (See Goal flow sheet completed today.)  Assessment of Progress: The patient's condition is improving.  The patient's condition has potential to improve.  The patient has met all of their long term goals.  Self Management Plans:  Patient has been instructed in a home treatment program.  Patient is independent in a home treatment program.  Patient  has been instructed in self management of symptoms.  Patient is independent in self management of symptoms.  I have re-evaluated this patient and find that the nature, scope, duration and intensity of the therapy is appropriate for the medical condition of the patient.  Roxana continues to require the following intervention to meet STG and LTG's: PT intervention is no longer required to meet STG/LTG.  We will discharge this patient from PT.    Recommendations:  This patient is ready to be discharged from therapy and continue their home treatment program.    Please refer to the daily flowsheet for treatment today, total treatment time and time spent performing 1:1 timed codes.    "

## 2019-08-29 ENCOUNTER — OFFICE VISIT (OUTPATIENT)
Dept: URGENT CARE | Facility: URGENT CARE | Age: 79
End: 2019-08-29
Payer: COMMERCIAL

## 2019-08-29 VITALS
HEART RATE: 65 BPM | OXYGEN SATURATION: 98 % | TEMPERATURE: 98.8 F | DIASTOLIC BLOOD PRESSURE: 84 MMHG | RESPIRATION RATE: 13 BRPM | SYSTOLIC BLOOD PRESSURE: 154 MMHG

## 2019-08-29 DIAGNOSIS — T63.441A BEE STING REACTION, ACCIDENTAL OR UNINTENTIONAL, INITIAL ENCOUNTER: Primary | ICD-10-CM

## 2019-08-29 DIAGNOSIS — L03.114 CELLULITIS OF LEFT UPPER EXTREMITY: ICD-10-CM

## 2019-08-29 DIAGNOSIS — M79.632 PAIN AND SWELLING OF FOREARM, LEFT: ICD-10-CM

## 2019-08-29 DIAGNOSIS — M79.89 PAIN AND SWELLING OF FOREARM, LEFT: ICD-10-CM

## 2019-08-29 PROCEDURE — 99213 OFFICE O/P EST LOW 20 MIN: CPT | Performed by: PHYSICIAN ASSISTANT

## 2019-08-29 RX ORDER — CEPHALEXIN 500 MG/1
500 CAPSULE ORAL 3 TIMES DAILY
Qty: 21 CAPSULE | Refills: 0 | Status: SHIPPED | OUTPATIENT
Start: 2019-08-29 | End: 2019-09-05

## 2019-08-29 RX ORDER — PREDNISONE 20 MG/1
40 TABLET ORAL DAILY
Qty: 10 TABLET | Refills: 0 | Status: SHIPPED | OUTPATIENT
Start: 2019-08-29 | End: 2019-09-03

## 2019-08-29 ASSESSMENT — ENCOUNTER SYMPTOMS
VOMITING: 0
ALLERGIC/IMMUNOLOGIC NEGATIVE: 1
NECK STIFFNESS: 0
MUSCULOSKELETAL NEGATIVE: 1
LIGHT-HEADEDNESS: 0
ENDOCRINE NEGATIVE: 1
DIZZINESS: 0
WEAKNESS: 0
BACK PAIN: 0
EYES NEGATIVE: 1
COUGH: 0
SHORTNESS OF BREATH: 0
CHILLS: 0
MYALGIAS: 0
WOUND: 1
PALPITATIONS: 0
BRUISES/BLEEDS EASILY: 0
FEVER: 0
SORE THROAT: 0
RHINORRHEA: 0
HEMATOLOGIC/LYMPHATIC NEGATIVE: 1
NAUSEA: 0
ARTHRALGIAS: 0
JOINT SWELLING: 0
RESPIRATORY NEGATIVE: 1
NECK PAIN: 0
DIARRHEA: 0
HEADACHES: 0
CARDIOVASCULAR NEGATIVE: 1

## 2019-08-29 NOTE — PROGRESS NOTES
Chief Complaint:    Chief Complaint   Patient presents with     Insect Bites     bee sting       HPI: Roxana Karimi is an 79 year old female who presents for evaluation and treatment of bee sting.  Patient was stung on the L wrist yesterday.  Today she has noticed swelling and redness in the L arm that is spreading.      ROS:      Review of Systems   Constitutional: Negative for chills and fever.   HENT: Negative for congestion, ear pain, rhinorrhea and sore throat.    Eyes: Negative.    Respiratory: Negative.  Negative for cough and shortness of breath.    Cardiovascular: Negative.  Negative for chest pain and palpitations.   Gastrointestinal: Negative for diarrhea, nausea and vomiting.   Endocrine: Negative.    Genitourinary: Negative.    Musculoskeletal: Negative.  Negative for arthralgias, back pain, joint swelling, myalgias, neck pain and neck stiffness.   Skin: Positive for rash and wound.   Allergic/Immunologic: Negative.  Negative for immunocompromised state.   Neurological: Negative for dizziness, weakness, light-headedness and headaches.   Hematological: Negative.  Does not bruise/bleed easily.        Family History   Family History   Problem Relation Age of Onset     Breast Cancer Mother      Blood Disease Mother         bone marrow depression-bone wouldnt produce hgb     C.A.D. Father      Hypertension Father      Respiratory Maternal Grandfather         asthma     Respiratory Daughter         asthma     Diabetes No family hx of      Cerebrovascular Disease No family hx of      Cancer - colorectal No family hx of        Social History  Social History     Socioeconomic History     Marital status:      Spouse name: Not on file     Number of children: Not on file     Years of education: Not on file     Highest education level: Not on file   Occupational History     Not on file   Social Needs     Financial resource strain: Not on file     Food insecurity:     Worry: Not on file     Inability: Not  on file     Transportation needs:     Medical: Not on file     Non-medical: Not on file   Tobacco Use     Smoking status: Never Smoker     Smokeless tobacco: Never Used   Substance and Sexual Activity     Alcohol use: No     Drug use: No     Sexual activity: Never     Partners: Male     Comment:    Lifestyle     Physical activity:     Days per week: Not on file     Minutes per session: Not on file     Stress: Not on file   Relationships     Social connections:     Talks on phone: Not on file     Gets together: Not on file     Attends Yazidism service: Not on file     Active member of club or organization: Not on file     Attends meetings of clubs or organizations: Not on file     Relationship status: Not on file     Intimate partner violence:     Fear of current or ex partner: Not on file     Emotionally abused: Not on file     Physically abused: Not on file     Forced sexual activity: Not on file   Other Topics Concern     Parent/sibling w/ CABG, MI or angioplasty before 65F 55M? No   Social History Narrative     Not on file        Surgical History:  Past Surgical History:   Procedure Laterality Date     NO HISTORY OF SURGERY          Problem List:  Patient Active Problem List   Diagnosis     CARDIOVASCULAR SCREENING; LDL GOAL LESS THAN 160     Advanced directives, counseling/discussion     Mild major depression (H)     Vitamin D deficiency     Obesity     Shingles     Preseptal cellulitis     Renal insufficiency     Bilateral shoulder pain        Allergies:  Allergies   Allergen Reactions     Codeine Sulfate      hives, swelling     No Known Drug Allergies         Current Meds:    Current Outpatient Medications:      Acetaminophen (TYLENOL PO), Take by mouth as needed for mild pain or fever, Disp: , Rfl:      ASPIRIN 81 MG OR TABS, 1 TABLET DAILY, Disp: , Rfl:      cephALEXin (KEFLEX) 500 MG capsule, Take 1 capsule (500 mg) by mouth 3 times daily for 7 days, Disp: 21 capsule, Rfl: 0     GLUCOSAMINE  CHONDROITIN COMPLX PO, , Disp: , Rfl:      magnesium 250 MG tablet, Take 1 tablet by mouth nightly as needed , Disp: , Rfl:      Omega-3 Fatty Acids (OMEGA-3 FISH OIL PO), Take 1 g by mouth daily Relief factor, Disp: , Rfl:      order for DME, Equipment being ordered: zipper Jobst or other compression stockings, Disp: 1 Units, Rfl: 0     predniSONE (DELTASONE) 20 MG tablet, Take 2 tablets (40 mg) by mouth daily for 5 days, Disp: 10 tablet, Rfl: 0     TURMERIC PO, , Disp: , Rfl:      vitamin  B complex with vitamin C (VITAMIN  B COMPLEX) TABS, Take 1 tablet by mouth daily, Disp: , Rfl:      VITAMIN D, CHOLECALCIFEROL, PO, Take 5,000 Units by mouth daily, Disp: , Rfl:      VITAMIN E PO, , Disp: , Rfl:      PHYSICAL EXAM:     Vital signs noted and reviewed by Mario Dodd PA-C  BP (!) 154/84 (BP Location: Right arm, Patient Position: Chair, Cuff Size: Adult Large)   Pulse 65   Temp 98.8  F (37.1  C) (Oral)   Resp 13   SpO2 98%      PEFR:    Physical Exam   Constitutional: She is oriented to person, place, and time. She appears well-developed and well-nourished. No distress.   HENT:   Head: Normocephalic and atraumatic.   Right Ear: Hearing, tympanic membrane, external ear and ear canal normal. Tympanic membrane is not perforated, not erythematous, not retracted and not bulging.   Left Ear: Hearing, tympanic membrane, external ear and ear canal normal. Tympanic membrane is not perforated, not erythematous, not retracted and not bulging.   Nose: Nose normal. No mucosal edema or rhinorrhea.   Mouth/Throat: Oropharynx is clear and moist and mucous membranes are normal. No oropharyngeal exudate, posterior oropharyngeal edema, posterior oropharyngeal erythema or tonsillar abscesses. Tonsils are 0 on the right. Tonsils are 0 on the left. No tonsillar exudate.   Eyes: Pupils are equal, round, and reactive to light. Conjunctivae and EOM are normal. Right eye exhibits no discharge. Left eye exhibits no discharge.   Neck:  Normal range of motion. Neck supple.   Cardiovascular: Normal rate, regular rhythm, normal heart sounds and intact distal pulses. Exam reveals no gallop and no friction rub.   No murmur heard.  Pulmonary/Chest: Effort normal and breath sounds normal. No stridor. No respiratory distress. She has no decreased breath sounds. She has no wheezes. She has no rhonchi. She has no rales. She exhibits no tenderness.   Abdominal: Soft. Bowel sounds are normal.   Musculoskeletal: Normal range of motion. She exhibits no tenderness or deformity.        Left forearm: She exhibits swelling and edema. She exhibits no tenderness and no bony tenderness.   Neurological: She is alert and oriented to person, place, and time. She displays normal reflexes. No cranial nerve deficit or sensory deficit. She exhibits normal muscle tone. Coordination normal.   Skin: Skin is warm. Capillary refill takes less than 2 seconds. No rash noted. She is not diaphoretic. No erythema.   Psychiatric: She has a normal mood and affect. Her behavior is normal. Judgment and thought content normal.   Nursing note and vitals reviewed.       Labs:       Medical Decision Making:    Differential Diagnosis:  Bee sting, allergic reaction, cellulitis.     ASSESSMENT:     1. Bee sting reaction, accidental or unintentional, initial encounter    2. Pain and swelling of forearm, left    3. Cellulitis of left upper extremity           PLAN:     Patient declined Decadron in clinic today.  Rx for Prednisone and Keflex today.  Patient may take Benadryl PRN  Patient instructed to follow up with PCP in 3 days if symptoms are not improving.  Sooner if symptoms worsen.  Worrisome symptoms discussed with instructions to go to the ED.  Patient verbalized understanding and agreed with this plan.     Mario Dodd PA-C  8/29/2019, 1:53 PM

## 2019-09-03 ENCOUNTER — OFFICE VISIT (OUTPATIENT)
Dept: FAMILY MEDICINE | Facility: CLINIC | Age: 79
End: 2019-09-03
Payer: COMMERCIAL

## 2019-09-03 VITALS
TEMPERATURE: 98.2 F | BODY MASS INDEX: 33.66 KG/M2 | HEART RATE: 58 BPM | HEIGHT: 63 IN | OXYGEN SATURATION: 99 % | RESPIRATION RATE: 18 BRPM

## 2019-09-03 DIAGNOSIS — T63.441D BEE STING REACTION, ACCIDENTAL OR UNINTENTIONAL, SUBSEQUENT ENCOUNTER: Primary | ICD-10-CM

## 2019-09-03 DIAGNOSIS — I10 BENIGN ESSENTIAL HYPERTENSION: ICD-10-CM

## 2019-09-03 DIAGNOSIS — Z63.79 STRESS DUE TO ILLNESS OF FAMILY MEMBER: ICD-10-CM

## 2019-09-03 LAB
ANION GAP SERPL CALCULATED.3IONS-SCNC: 6 MMOL/L (ref 3–14)
BUN SERPL-MCNC: 24 MG/DL (ref 7–30)
CALCIUM SERPL-MCNC: 9.5 MG/DL (ref 8.5–10.1)
CHLORIDE SERPL-SCNC: 106 MMOL/L (ref 94–109)
CO2 SERPL-SCNC: 29 MMOL/L (ref 20–32)
CREAT SERPL-MCNC: 1.05 MG/DL (ref 0.52–1.04)
GFR SERPL CREATININE-BSD FRML MDRD: 51 ML/MIN/{1.73_M2}
GLUCOSE SERPL-MCNC: 79 MG/DL (ref 70–99)
POTASSIUM SERPL-SCNC: 4 MMOL/L (ref 3.4–5.3)
SODIUM SERPL-SCNC: 141 MMOL/L (ref 133–144)

## 2019-09-03 PROCEDURE — 80048 BASIC METABOLIC PNL TOTAL CA: CPT | Performed by: NURSE PRACTITIONER

## 2019-09-03 PROCEDURE — 36415 COLL VENOUS BLD VENIPUNCTURE: CPT | Performed by: NURSE PRACTITIONER

## 2019-09-03 PROCEDURE — 99214 OFFICE O/P EST MOD 30 MIN: CPT | Performed by: NURSE PRACTITIONER

## 2019-09-03 PROCEDURE — 99207 C PAF COMPLETED  NO CHARGE: CPT | Performed by: NURSE PRACTITIONER

## 2019-09-03 RX ORDER — LOSARTAN POTASSIUM 50 MG/1
50 TABLET ORAL DAILY
Qty: 90 TABLET | Refills: 3 | Status: SHIPPED | OUTPATIENT
Start: 2019-09-03 | End: 2020-08-21

## 2019-09-03 ASSESSMENT — PATIENT HEALTH QUESTIONNAIRE - PHQ9: SUM OF ALL RESPONSES TO PHQ QUESTIONS 1-9: 0

## 2019-09-03 NOTE — PROGRESS NOTES
"Subjective   Finished prednisone yesterday   Roxana Karimi is a 79 year old female who presents to clinic today for the following health issues:    HPI   ED/UC Followup:    Facility:  [facility]  Date of visit: 8/29/2019  Reason for visit: Bee sting on left arm   Current Status: Better      She just finished Prednisone and has two days left of Keflex.    The redness and swelling has improved.  She denies any fevers.    Her  has mild cognitive impairment.  This has created stress for her.  She denies any depression.  She does go to [facility] and see a therapist.         Her blood pressure has been elevated for years.            Reviewed and updated as needed this visit by Provider  Tobacco  Allergies  Meds  Problems  Med Hx  Surg Hx  Fam Hx         Review of Systems   ROS COMP: CONSTITUTIONAL: NEGATIVE for fever, chills, change in weight  ENT/MOUTH: NEGATIVE for ear, mouth and throat problems  RESP: NEGATIVE for significant cough or SOB  CV: NEGATIVE for chest pain, palpitations or peripheral edema  GI: NEGATIVE for nausea, abdominal pain, heartburn, or change in bowel habits  MUSCULOSKELETAL: NEGATIVE for significant arthralgias or myalgia  NEURO: NEGATIVE for weakness, dizziness or paresthesias  PSYCHIATRIC: see HPI      Objective    BP (!) (P) 148/70   Pulse 58   Temp 98.2  F (36.8  C) (Oral)   Resp 18   Ht 1.6 m (5' 3\")   SpO2 99%   BMI 33.66 kg/m    Body mass index is 33.66 kg/m .  Physical Exam   GENERAL: healthy, alert and no distress  RESP: lungs clear to auscultation - no rales, rhonchi or wheezes  CV: regular rate and rhythm, normal S1 S2, no S3 or S4, no murmur, click or rub, no peripheral edema and peripheral pulses strong  Skin: left forearm without erythema or edema  PSYCH: mentation appears normal, affect teary            Assessment & Plan     1. Bee sting reaction, accidental or unintentional, subsequent encounter  improved    2. Stress due to illness of family member  She " will continue to see her therapist.  She also goes to an Alzhemier's group monthly.     3. Benign essential hypertension  New diagnosis.  Will start Losartan.  Discussed the use and indication of this medication as well as potential side effects.   Recheck blood pressure in 2-3 weeks.   - Basic metabolic panel  - losartan (COZAAR) 50 MG tablet; Take 1 tablet (50 mg) by mouth daily  Dispense: 90 tablet; Refill: 3           Return in about 2 weeks (around 9/17/2019) for BP Recheck.    Gloria Akbar, DION  [facility]

## 2019-09-03 NOTE — LETTER
September 4, 2019      Roxana Karimi  [address]  [place] [place] [facility] [address]        Dear ,    We are writing to inform you of your test results.    Kidney function is a bit decreased, but stable overall compared to past results.  Getting your blood pressure under control can help prevent kidney damage related to hypertension.     Resulted Orders   Basic metabolic panel   Result Value Ref Range    Sodium 141 133 - 144 mmol/L    Potassium 4.0 3.4 - 5.3 mmol/L    Chloride 106 94 - 109 mmol/L    Carbon Dioxide 29 20 - 32 mmol/L    Anion Gap 6 3 - 14 mmol/L    Glucose 79 70 - 99 mg/dL      Comment:      Fasting specimen    Urea Nitrogen 24 7 - 30 mg/dL    Creatinine 1.05 (H) 0.52 - 1.04 mg/dL    GFR Estimate 51 (L) >60 mL/min/[1.73_m2]      Comment:      Non  GFR Calc  Starting 12/18/2018, serum creatinine based estimated GFR (eGFR) will be   calculated using the Chronic Kidney Disease Epidemiology Collaboration   (CKD-EPI) equation.      GFR Estimate If Black 59 (L) >60 mL/min/[1.73_m2]      Comment:       GFR Calc  Starting 12/18/2018, serum creatinine based estimated GFR (eGFR) will be   calculated using the Chronic Kidney Disease Epidemiology Collaboration   (CKD-EPI) equation.      Calcium 9.5 8.5 - 10.1 mg/dL       If you have any questions or concerns, please call the clinic at the number listed above.       Sincerely,        Gloria Akbar, NP/nr

## 2019-09-20 ENCOUNTER — ALLIED HEALTH/NURSE VISIT (OUTPATIENT)
Dept: NURSING | Facility: CLINIC | Age: 79
End: 2019-09-20
Payer: COMMERCIAL

## 2019-09-20 VITALS — SYSTOLIC BLOOD PRESSURE: 128 MMHG | DIASTOLIC BLOOD PRESSURE: 60 MMHG | HEART RATE: 67 BPM | OXYGEN SATURATION: 99 %

## 2019-09-20 DIAGNOSIS — I10 BENIGN ESSENTIAL HYPERTENSION: Primary | ICD-10-CM

## 2019-09-20 PROCEDURE — 99207 ZZC NO CHARGE NURSE ONLY: CPT

## 2020-08-17 DIAGNOSIS — I10 BENIGN ESSENTIAL HYPERTENSION: ICD-10-CM

## 2020-08-20 NOTE — TELEPHONE ENCOUNTER
Patient called to inquire about the status of this request. She is worried she won't get this before the weekend and only has 4 days left. Please assist. Thanks!

## 2020-08-21 RX ORDER — LOSARTAN POTASSIUM 50 MG/1
50 TABLET ORAL DAILY
Qty: 90 TABLET | Refills: 0 | Status: SHIPPED | OUTPATIENT
Start: 2020-08-21 | End: 2020-10-26

## 2020-10-26 ENCOUNTER — OFFICE VISIT (OUTPATIENT)
Dept: NURSING | Facility: CLINIC | Age: 80
End: 2020-10-26
Payer: COMMERCIAL

## 2020-10-26 DIAGNOSIS — Z23 NEED FOR PROPHYLACTIC VACCINATION AND INOCULATION AGAINST INFLUENZA: Primary | ICD-10-CM

## 2020-10-26 DIAGNOSIS — I10 BENIGN ESSENTIAL HYPERTENSION: ICD-10-CM

## 2020-10-26 PROCEDURE — 90471 IMMUNIZATION ADMIN: CPT

## 2020-10-26 PROCEDURE — 90662 IIV NO PRSV INCREASED AG IM: CPT

## 2020-10-26 RX ORDER — LOSARTAN POTASSIUM 50 MG/1
50 TABLET ORAL DAILY
Qty: 90 TABLET | Refills: 3 | Status: SHIPPED | OUTPATIENT
Start: 2020-10-26 | End: 2021-11-05

## 2021-11-03 DIAGNOSIS — I10 BENIGN ESSENTIAL HYPERTENSION: ICD-10-CM

## 2021-11-05 NOTE — TELEPHONE ENCOUNTER
Routing refill request to provider for review/approval because:  Labs out of range:  Creatinine  Creatinine   Date Value Ref Range Status   09/03/2019 1.05 (H) 0.52 - 1.04 mg/dL Final     Has not been seen since 9/3/2019    Team Coordinators: Please contact patient to set up annual physical for any further refills.     JOSE ANTONIO SalcidoN RN  [facility]

## 2021-11-07 RX ORDER — LOSARTAN POTASSIUM 50 MG/1
TABLET ORAL
Qty: 30 TABLET | Refills: 1 | Status: SHIPPED | OUTPATIENT
Start: 2021-11-07 | End: 2022-01-13

## 2022-01-12 DIAGNOSIS — I10 BENIGN ESSENTIAL HYPERTENSION: ICD-10-CM

## 2022-01-13 NOTE — TELEPHONE ENCOUNTER
Routing refill request to provider for review/approval because:  Glenda refill given x1   Labs out of range:  Creatinine          Creatinine   Date Value Ref Range Status   09/03/2019 1.05 (H) 0.52 - 1.04 mg/dL Final      Has not been seen since 9/3/2019       Team Coordinators: Please contact patient to set up annual physical for any further refills.      JOSE ANTONIO SalcidoN RN  [facility]

## 2022-01-16 RX ORDER — LOSARTAN POTASSIUM 50 MG/1
TABLET ORAL
Qty: 30 TABLET | Refills: 0 | Status: SHIPPED | OUTPATIENT
Start: 2022-01-16 | End: 2022-02-07

## 2022-01-19 ENCOUNTER — VIRTUAL VISIT (OUTPATIENT)
Dept: FAMILY MEDICINE | Facility: CLINIC | Age: 82
End: 2022-01-19
Payer: COMMERCIAL

## 2022-01-19 DIAGNOSIS — Z53.9 ERRONEOUS ENCOUNTER--DISREGARD: Primary | ICD-10-CM

## 2022-01-19 NOTE — PROGRESS NOTES
"Roxana is a 81 year old who is being evaluated via a billable telephone visit.      What phone number would you like to be contacted at? [phone]  How would you like to obtain your AVS? Mail a copy  Left a message at 2:03 pm and 2:08 pm  Please schedule lab-only visit and reschedule virtual visit with me after her lab appointment.     {PROVIDER CHARTING PREFERENCE:617014}    Subjective   Roxana is a 81 year old who presents for the following health issues {ACCOMPANIED BY STATEMENT (Optional):549978}    HPI     Hypertension Follow-up      Do you check your blood pressure regularly outside of the clinic? Yes     Are you following a low salt diet? No    Are your blood pressures ever more than 140 on the top number (systolic) OR more   than 90 on the bottom number (diastolic), for example 140/90? Yes      How many servings of fruits and vegetables do you eat daily?  2-3    On average, how many sweetened beverages do you drink each day (Examples: soda, juice, sweet tea, etc.  Do NOT count diet or artificially sweetened beverages)?   1    How many days per week do you exercise enough to make your heart beat faster? 5    How many minutes a day do you exercise enough to make your heart beat faster? 20 - 29    How many days per week do you miss taking your medication? 0    {additonal problems for provider to add (Optional):532277}    Review of Systems   {ROS COMP (Optional):833961}      Objective           Vitals:  No vitals were obtained today due to virtual visit.    Physical Exam   {GENERAL APPEARANCE:50::\"healthy\",\"alert\",\"no distress\"}  PSYCH: Alert and oriented times 3; coherent speech, normal   rate and volume, able to articulate logical thoughts, able   to abstract reason, no tangential thoughts, no hallucinations   or delusions  Her affect is { :9553046::\"normal\"}  RESP: No cough, no audible wheezing, able to talk in full sentences  Remainder of exam unable to be completed due to telephone " visits    {Diagnostic Test Results (Optional):857643}    {AMBULATORY ATTESTATION (Optional):711716}        Phone call duration: *** minutes

## 2022-02-02 ENCOUNTER — LAB (OUTPATIENT)
Dept: LAB | Facility: CLINIC | Age: 82
End: 2022-02-02
Payer: COMMERCIAL

## 2022-02-02 DIAGNOSIS — Z11.59 NEED FOR HEPATITIS C SCREENING TEST: ICD-10-CM

## 2022-02-02 DIAGNOSIS — Z13.220 SCREENING FOR HYPERLIPIDEMIA: ICD-10-CM

## 2022-02-02 DIAGNOSIS — I10 BENIGN ESSENTIAL HYPERTENSION: ICD-10-CM

## 2022-02-02 LAB
ANION GAP SERPL CALCULATED.3IONS-SCNC: 5 MMOL/L (ref 3–14)
BUN SERPL-MCNC: 25 MG/DL (ref 7–30)
CALCIUM SERPL-MCNC: 10 MG/DL (ref 8.5–10.1)
CHLORIDE BLD-SCNC: 109 MMOL/L (ref 94–109)
CHOLEST SERPL-MCNC: 265 MG/DL
CO2 SERPL-SCNC: 28 MMOL/L (ref 20–32)
CREAT SERPL-MCNC: 1.23 MG/DL (ref 0.52–1.04)
FASTING STATUS PATIENT QL REPORTED: YES
GFR SERPL CREATININE-BSD FRML MDRD: 44 ML/MIN/1.73M2
GLUCOSE BLD-MCNC: 82 MG/DL (ref 70–99)
HCV AB SERPL QL IA: NONREACTIVE
HDLC SERPL-MCNC: 77 MG/DL
LDLC SERPL CALC-MCNC: 165 MG/DL
NONHDLC SERPL-MCNC: 188 MG/DL
POTASSIUM BLD-SCNC: 4 MMOL/L (ref 3.4–5.3)
SODIUM SERPL-SCNC: 142 MMOL/L (ref 133–144)
TRIGL SERPL-MCNC: 115 MG/DL

## 2022-02-02 PROCEDURE — 80048 BASIC METABOLIC PNL TOTAL CA: CPT

## 2022-02-02 PROCEDURE — 80061 LIPID PANEL: CPT

## 2022-02-02 PROCEDURE — 36415 COLL VENOUS BLD VENIPUNCTURE: CPT

## 2022-02-02 PROCEDURE — 86803 HEPATITIS C AB TEST: CPT

## 2022-02-07 ENCOUNTER — VIRTUAL VISIT (OUTPATIENT)
Dept: FAMILY MEDICINE | Facility: CLINIC | Age: 82
End: 2022-02-07
Payer: COMMERCIAL

## 2022-02-07 VITALS — SYSTOLIC BLOOD PRESSURE: 130 MMHG | DIASTOLIC BLOOD PRESSURE: 70 MMHG

## 2022-02-07 DIAGNOSIS — I10 BENIGN ESSENTIAL HYPERTENSION: Primary | ICD-10-CM

## 2022-02-07 DIAGNOSIS — Z63.6 CAREGIVER BURDEN: ICD-10-CM

## 2022-02-07 DIAGNOSIS — N18.32 STAGE 3B CHRONIC KIDNEY DISEASE (H): ICD-10-CM

## 2022-02-07 PROCEDURE — 99443 PR PHYSICIAN TELEPHONE EVALUATION 21-30 MIN: CPT | Mod: 95 | Performed by: NURSE PRACTITIONER

## 2022-02-07 RX ORDER — LOSARTAN POTASSIUM 50 MG/1
50 TABLET ORAL DAILY
Qty: 90 TABLET | Refills: 3 | Status: SHIPPED | OUTPATIENT
Start: 2022-02-07 | End: 2022-08-15

## 2022-02-07 SDOH — SOCIAL STABILITY - SOCIAL INSECURITY: DEPENDENT RELATIVE NEEDING CARE AT HOME: Z63.6

## 2022-02-07 NOTE — PROGRESS NOTES
Roxana is a 81 year old who is being evaluated via a billable telephone visit.      What phone number would you like to be contacted at? [phone]  How would you like to obtain your AVS?     Assessment & Plan     (I10) Benign essential hypertension  (primary encounter diagnosis)  Comment: at goal  Plan: losartan (COZAAR) 50 MG tablet        The current medical regimen is effective;  continue present plan and medications.   Reviewed recent labs.    (N18.32) Stage 3b chronic kidney disease (H)  Comment: stable  Plan: Discussed avoiding NSAIDs.     (Z63.6) Caregiver burden  Comment:   Plan: Care Coordination Referral        Referral to  given.       Discussed COVID vaccine, which I recommended.  She declines.    30 minutes spent on the date of the encounter doing chart review, patient visit and documentation            No follow-ups on file.    Gloria Akbar NP  [facility]    Long Schmidt is a 81 year old who presents for the following health issues     HPI   Discuss Lab Results   Patient is trying to avoid salty foods. Taking Losartan in the morning. Should she take at night?    BP has been 130 &140-150s/50s & 60-70s  Lowest reading last week 103/48, 120/55      Pts  Mikayla has alzheimer's. Will not go to doctor. He will not leave the house.   They won't renew his medications because he hasn't seen the doctor. She has noticed his appetite has declined; hasn't wanted to drink premire the last 2 days. He does willingly takes his medications.   -Has a dog; vet comes to house, enjoys watching T.V. and watching birds   -wife has power of  under health care directive   -wife went to an alzheimers group; no longer offering services   -Has 2 daughters. 1 daughter had a stroke; uses wheelchair   - for 61 years       Wife says her  trusts PCP Gloria Akbar  Should pt's  still take Metformin 1,000 MG?            Review of Systems          Objective           Vitals:  No vitals were obtained today due to virtual visit.    Physical Exam   healthy, alert and no distress  PSYCH: Alert and oriented times 3; coherent speech, normal   rate and volume, able to articulate logical thoughts, able   to abstract reason, no tangential thoughts, no hallucinations   or delusions  Her affect is tearful  RESP: No cough, no audible wheezing, able to talk in full sentences  Remainder of exam unable to be completed due to telephone visits                Phone call duration: 24 minutes

## 2022-02-08 ENCOUNTER — PATIENT OUTREACH (OUTPATIENT)
Dept: NURSING | Facility: CLINIC | Age: 82
End: 2022-02-08
Payer: COMMERCIAL

## 2022-02-08 NOTE — PROGRESS NOTES
"Clinic Care Coordination Contact  Community Health Worker Initial Outreach    CHW Initial Information Gathering:  Referral Source: PCP  Current living arrangement:: I live in a private home with spouse  Type of residence:: Private home - stairs  Informal Support system:: Children,Family,Neighbors,Friends,Other  No PCP office visit in Past Year: No  Transportation means:: Regular car       Patient accepts CC: Yes. Patient scheduled for assessment with CC SW on 2/11/22 at 10:00 AM. Patient noted desire to discuss Care Coordination.     2-8, CHW:    CHW introduced self and Care Coordination.    Patient was involved with a support group, however it ended due to COVID-19.    Patient is interested in Care Giver Support groups (would prefer virtual, discussed calling into online support groups via phone as patient's computer is broken)    Roxana is very independent (shovels herself if the snow is light, however receives assistance from neighbor if it is heavy). She would like local resources for assistance, so she does not have to rely on neighbor for shoveling; has already hired someone for lawn care service for this summer.     Roxana is grieving her husbands (Zoila) illness; she is connected with her noel which has gotten her through this.    Wants to take care of  and keep him at home.  struggles with appetite which is of concern to the patient ( eats 700-1000 calories) and will not leave the home. She did however, convince him to make a PCP appointment with DION Akbar on 4/4, which she hopes he follows through with.     Patient wants support from  for if \"anything were to happen\" referencing her husbands decline in health.    Patient goes grocery shopping about once a week and visits daughter Miranda once a week. She goes early in the morning before Zoila is awake.     Patient wants resources/options to have someone come and check the patient/stay with them when she is gone a couple times a week. " She does not need this now, but for in the future. Roxana feels guilty for making these plans/looking into these options as it assumes decline in husbands health.    Patient recently had PCP appointment, however it has been some time since she has been seen. Roxana explained limiting contact and avoiding appointments due to germs and COVID-19. Patient is very careful and tries to limit exposure to others due to COVID-19. PCP discussed COVID-19 vaccine with patient at appointment, however declined.    CHW inquired if the patient had any other questions or concerns at this time, however the patient declined.    BEN Sanchez. Public Health  Community Health Worker  [facility]  Clinic Care Coordination  [phone]

## 2022-02-11 ENCOUNTER — PATIENT OUTREACH (OUTPATIENT)
Dept: NURSING | Facility: CLINIC | Age: 82
End: 2022-02-11
Payer: COMMERCIAL

## 2022-02-11 DIAGNOSIS — Z63.6 CAREGIVER BURDEN: ICD-10-CM

## 2022-02-11 SDOH — SOCIAL STABILITY - SOCIAL INSECURITY: DEPENDENT RELATIVE NEEDING CARE AT HOME: Z63.6

## 2022-02-11 SDOH — ECONOMIC STABILITY: TRANSPORTATION INSECURITY
IN THE PAST 12 MONTHS, HAS LACK OF TRANSPORTATION KEPT YOU FROM MEETINGS, WORK, OR FROM GETTING THINGS NEEDED FOR DAILY LIVING?: NO

## 2022-02-11 SDOH — ECONOMIC STABILITY: FOOD INSECURITY: WITHIN THE PAST 12 MONTHS, YOU WORRIED THAT YOUR FOOD WOULD RUN OUT BEFORE YOU GOT MONEY TO BUY MORE.: NEVER TRUE

## 2022-02-11 SDOH — ECONOMIC STABILITY: FOOD INSECURITY: WITHIN THE PAST 12 MONTHS, THE FOOD YOU BOUGHT JUST DIDN'T LAST AND YOU DIDN'T HAVE MONEY TO GET MORE.: NEVER TRUE

## 2022-02-11 SDOH — ECONOMIC STABILITY: TRANSPORTATION INSECURITY
IN THE PAST 12 MONTHS, HAS THE LACK OF TRANSPORTATION KEPT YOU FROM MEDICAL APPOINTMENTS OR FROM GETTING MEDICATIONS?: NO

## 2022-02-11 ASSESSMENT — SOCIAL DETERMINANTS OF HEALTH (SDOH): HOW HARD IS IT FOR YOU TO PAY FOR THE VERY BASICS LIKE FOOD, HOUSING, MEDICAL CARE, AND HEATING?: NOT HARD AT ALL

## 2022-02-11 ASSESSMENT — ACTIVITIES OF DAILY LIVING (ADL): DEPENDENT_IADLS:: INDEPENDENT

## 2022-02-11 NOTE — LETTER
[facility] CARE COORDINATION  [address]  [place] [facility] [address]    February 14, 2022    Roxana Karimi  [address]  [place] [facility] [address]      Dear Roxana,    I am a clinic care coordinator who works with Gloria Akbar, NP at [facility]. I wanted to thank you for spending the time to talk with me.  Below is a description of clinic care coordination and how I can further assist you.      The clinic care coordination team is made up of a registered nurse,  and community health worker who understand the health care system. The goal of clinic care coordination is to help you manage your health and improve access to the health care system in the most efficient manner. The team can assist you in meeting your health care goals by providing education, coordinating services, strengthening the communication among your providers and supporting you with any resource needs.    Please feel free to contact me at [phone] with any questions or concerns. We are focused on providing you with the highest-quality healthcare experience possible and that all starts with you.     Sincerely,     Samreen Roque, [facility] Clinic Care Coordination  Tel: [phone]      Enclosed: I have enclosed a copy of the Patient Centered Plan of Care. This has helpful information and goals that we have talked about. Please keep this in an easy to access place to use as needed.

## 2022-02-11 NOTE — LETTER
[facility]  Patient Centered Plan of Care  About Me:        Patient Name:  Roxana Karimi    YOB: 1940  Age:         82 year old   [place] MRN:    [id number] Telephone Information:  Home Phone [phone]   Mobile Not on file.       Address:  [address] [place] [id number] Email address:  No e-mail address on record      Emergency Contact(s)    Name Relationship Lgl Grd Work Phone Home Phone Mobile Phone   1. RUDDY KARIMI Spouse No  [phone]    2. ALEJANDRINA KARIMI* Daughter No  [phone]            Primary language:  English     needed? No   [employer]:  [phone] op. 1  Other communication barriers:Caregiver    Preferred Method of Communication:  Mail  Current living arrangement: I live in a private home with spouse    Mobility Status/ Medical Equipment: Independent        Health Maintenance  Health Maintenance Reviewed: Due/Overdue   Health Maintenance Due   Topic Date Due     MICROALBUMIN  Never done     PARATHYROID  Never done     PHOSPHORUS  Never done     COVID-19 Vaccine (1) Never done     ZOSTER IMMUNIZATION (2 of 3) 03/27/2014     HEMOGLOBIN  10/07/2014     MEDICARE ANNUAL WELLNESS VISIT  04/20/2017     ADVANCE CARE PLANNING  10/03/2017     DTAP/TDAP/TD IMMUNIZATION (3 - Td or Tdap) 08/29/2021     INFLUENZA VACCINE (1) 09/01/2021           My Access Plan  Medical Emergency 911   Primary Clinic Line [facility] - [phone]   24 Hour Appointment Line [phone] or  [phone]) (toll-free)   24 Hour Nurse Line [phone] (toll-free)   Preferred Urgent Care [facility], [phone]     Preferred Hospital Other     Preferred Pharmacy [id number] - [place], [place] - [address] AT [address]     Behavioral Health Crisis Line The National Suicide Prevention Lifeline at 1-448.438.1884 or 911             My  Care Team Members  Patient Care Team       Relationship Specialty Notifications Start End    Gloria Akbar, NP PCP - General   7/12/02     Phone: [phone] Fax: [phone]         [address] [facility] [address]    Gloria Akbar NP Assigned PCP   9/8/19     Phone: [phone] Fax: [phone]         [address] [facility] [address]    Samreen Roque LSW Lead Care Coordinator Primary Care - CC Admissions 2/11/22     Phone: [phone]                 My Care Plans  Self Management and Treatment Plan  Goals and (Comments)  Goals        General     Functional (pt-stated)      Notes - Note edited  2/11/2022 10:28 AM by Samreen Roque LSW     Goal Statement: I will reach out to the [facility] for caregiver support and visit my daughter once weekly for the next 6 months  Date Goal set: 02/11/22  Barriers: psycho social barriers  Strengths: pt is caring for her   Date to Achieve By: 08/11/22  Patient expressed understanding of goal: yes    Action steps to achieve this goal:    1. I will reach out to [facility] at [phone] for caregiver support  2. I will visit my daughter one time per week  3. I will keep in contact with care coordination               Action Plans on File:            Depression          Advance Care Plans/Directives Type:   No data recorded    My Medical and Care Information  Problem List   Patient Active Problem List   Diagnosis     CARDIOVASCULAR SCREENING; LDL GOAL LESS THAN 160     Advanced directives, counseling/discussion     Vitamin D deficiency     Obesity     Shingles     Bilateral shoulder pain     Stress due to illness of family member     Benign essential hypertension     Stage 3b chronic kidney disease (H)      Current Medications and Allergies:  See printed Medication Report.    Care Coordination Start Date: 2/11/2022   Frequency of Care Coordination: monthly     Form Last Updated: 02/14/2022

## 2022-02-11 NOTE — PROGRESS NOTES
Clinic Care Coordination Contact    Clinic Care Coordination Contact  OUTREACH    Referral Information:  Referral Source: PCP    Question Answer   Reason for Referral: Patient/Caregiver Support   Patient/Caregiver Suport: Resources for Support   Clinical Staff have discussed the Care Coordination Referral with the patient and/or caregiver:          Primary Diagnosis: Psychosocial    Chief Complaint   Patient presents with     Clinic Care Coordination - Initial     Social work        Universal Utilization:  Clinic Utilization  Difficulty keeping appointments:: No  Compliance Concerns: No  No-Show Concerns: No  No PCP office visit in Past Year: No  Utilization    Hospital Admissions  0             ED Visits  0             No Show Count (past year)  0                Current as of: 2/11/2022  1:03 AM              Clinical Concerns:  Current Medical Concerns:    Patient Active Problem List   Diagnosis     CARDIOVASCULAR SCREENING; LDL GOAL LESS THAN 160     Advanced directives, counseling/discussion     Vitamin D deficiency     Obesity     Shingles     Bilateral shoulder pain     Stress due to illness of family member     Benign essential hypertension     Stage 3b chronic kidney disease (H)         Current Behavioral Concerns: Pt is primary caregiver for     Education Provided to patient: SWCC discussed alzheimers appetite, role of care coordination; cc provided resources for caregiver support groups   Pain  Pain (GOAL):: No  Health Maintenance Reviewed: Due/Overdue   Health Maintenance Due   Topic Date Due     MICROALBUMIN  Never done     PARATHYROID  Never done     PHOSPHORUS  Never done     COVID-19 Vaccine (1) Never done     ZOSTER IMMUNIZATION (2 of 3) 03/27/2014     HEMOGLOBIN  10/07/2014     MEDICARE ANNUAL WELLNESS VISIT  04/20/2017     ADVANCE CARE PLANNING  10/03/2017     FALL RISK ASSESSMENT  02/27/2020     DTAP/TDAP/TD IMMUNIZATION (3 - Td or Tdap) 08/29/2021     INFLUENZA VACCINE (1) 09/01/2021        Clinical Pathway: None    Medication Management:  Medication review status: Medications reviewed and no changes reported per patient.        Current Outpatient Medications   Medication Sig Dispense Refill     Acetaminophen (TYLENOL PO) Take by mouth as needed for mild pain or fever       losartan (COZAAR) 50 MG tablet Take 1 tablet (50 mg) by mouth daily 90 tablet 3     magnesium 250 MG tablet Take 400 mg by mouth nightly as needed        Multiple Vitamins-Minerals (ZINC PO) Take by mouth daily ZINC       Omega-3 Fatty Acids (OMEGA-3 FISH OIL PO) Take 2 g by mouth daily Relief factor       order for DME Equipment being ordered: zipper Jobst or other compression stockings (Patient not taking: Reported on 2/7/2022) 1 Units 0     TURMERIC PO Take 2 capsules by mouth daily        vitamin  B complex with vitamin C (VITAMIN  B COMPLEX) TABS Take 1 tablet by mouth daily as needed        VITAMIN D, CHOLECALCIFEROL, PO Take 5,000 Units by mouth daily       VITAMIN E PO Take 1 capsule by mouth daily             Functional Status:  Dependent ADLs:: Independent  Dependent IADLs:: Independent  Bed or wheelchair confined:: No  Mobility Status: Independent  Fallen 2 or more times in the past year?: No  Any fall with injury in the past year?: No    Living Situation:  Current living arrangement:: I live in a private home with spouse  [facility]    Lifestyle & Psychosocial Needs:    Social Determinants of Health     Tobacco Use: Low Risk      Smoking Tobacco Use: Never Smoker     Smokeless Tobacco Use: Never Used   Alcohol Use: Not on file   Financial Resource Strain: Low Risk      Difficulty of Paying Living Expenses: Not hard at all   Food Insecurity: No Food Insecurity     Worried About Running Out of Food in the Last Year: Never true     Ran Out of Food in the Last Year: Never true   Transportation Needs: No Transportation Needs     Lack of Transportation (Medical): No     Lack of  Transportation (Non-Medical): No   Physical Activity: Not on file   Stress: Not on file   Social Connections: Not on file   Intimate Partner Violence: Not on file   Depression: Not at risk     PHQ-2 Score: 1   Housing Stability: Not on file     Diet:: Regular  Inadequate nutrition (GOAL):: No  Tube Feeding: No  Inadequate activity/exercise (GOAL):: No  Significant changes in sleep pattern (GOAL): No  Transportation means:: Regular car     Baptism or spiritual beliefs that impact treatment:: No  Mental health DX:: No  Mental health management concern (GOAL):: Yes  Chemical Dependency Status: No Current Concerns  Informal Support system:: Children,Family,Neighbors,Friends,Other     [facility] has an in person support group. [phone]    Patient shares she is taking it one day at a time.    She shares the reason why she is talking to  is that she has concerns that Zoila kapoort go to the Dr. He takes several meds that are prescribed for him. He needs to see the dr for them to get refilled. Patient does have an appt for April 4th. He has refused to go to Dr Bryson. He shares he has a high regard for Dr. Akbar    Patient has not been physically ill- no fevers, colds, chills. Patient has a very poor appetite. But he is willingly take his meds. He will sign the checks for the bills after patient writes them out. She shares he was a great big strong man and now he has lost a lot. She feels her  feels hopeless. She shares they have lived in their house for 52 years. He shares he is familiar with the area.    Patient's  takes vitamins.     Visiting tristen [phone]    Patient's daughter had a stroke. Is paralyzed on the left side of her body.     Patient goes to a bible study. Patient's  doesn't like her to talk on the phone. He will talk in the background.    Discussed patient's husbands appetite. [facility] suggested just giving him what he wants. He loves sandwiches. [facility]  suggested feeding him sandwiches. Discussed not getting shook up when he doesn't eat what she makes.         Resources and Interventions:  Current Resources:      Community Resources: None  Supplies used at home:: None  Equipment Currently Used at Home: none  Employment Status: retired         Advance Care Plan/Directive  Advanced Care Plans/Directives on file:: No  Type Advanced Care Plans/Directives: Advanced Directive - On File  Advanced Care Plan/Directive Status: Declined Further Information    Referrals Placed: PCA     Goals:   Goals        General     Functional (pt-stated)      Notes - Note edited  2/11/2022 10:28 AM by Samreen Roque LSW     Goal Statement: I will reach out to the [facility] for caregiver support and visit my daughter once weekly for the next 6 months  Date Goal set: 02/11/22  Barriers: psycho social barriers  Strengths: pt is caring for her   Date to Achieve By: 08/11/22  Patient expressed understanding of goal: yes    Action steps to achieve this goal:    1. I will reach out to The [facility] at [phone] for caregiver support  2. I will visit my daughter one time per week  3. I will keep in contact with care coordination              Patient/Caregiver understanding:Patient verbalized understanding of the goal & action steps to achieve goal. Patient is in agreement with plan.      Outreach Frequency: monthly      Plan: 1. Patient to reach out to [facility]  2. Patient to visit her daughter once per week at minimum  3. CC to send intro letter and CCP to patient  4. CC will follow up in 1 month.    NORMAN Peoples   [facility] Care Coordination  Tel: [phone]

## 2022-02-19 DIAGNOSIS — I10 BENIGN ESSENTIAL HYPERTENSION: ICD-10-CM

## 2022-02-21 RX ORDER — LOSARTAN POTASSIUM 50 MG/1
TABLET ORAL
Qty: 30 TABLET | Refills: 0 | OUTPATIENT
Start: 2022-02-21

## 2022-02-21 NOTE — TELEPHONE ENCOUNTER
losartan (COZAAR) 50 MG tablet  Message sent to pharmacy - Refusal reason: Should already have refills on file (ORDER SENT 2/7/22 TO REQUESTING [facility]198 FOR 1 YR SUPPLY.).  Opal GAMEZ

## 2022-03-18 ENCOUNTER — PATIENT OUTREACH (OUTPATIENT)
Dept: CARE COORDINATION | Facility: CLINIC | Age: 82
End: 2022-03-18

## 2022-03-18 ENCOUNTER — PATIENT OUTREACH (OUTPATIENT)
Dept: NURSING | Facility: CLINIC | Age: 82
End: 2022-03-18
Payer: COMMERCIAL

## 2022-03-18 NOTE — PROGRESS NOTES
Clinic Care Coordination Contact    Follow Up Progress Note      Assessment: Patient returned call to [facility].    Patient shares she started at [facility] for caregiver support. It meets once a month on the first Friday of every month. She really enjoyed it and would like to continue to go.    Patient shares that they need in home nursing for . She feels  needs to be seen by a medical professional but refuses to go in. She feels he needs to be seen maybe for a hospice evaluation but would like a home care nurse to initially assess.    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 She shares she is struggling because she does not feel she can fill the role of her 's nurse and has no intention of doing so.    She shares her  needs to be evaluated by a nurse. She would really like home care for her . He is not eating well, low appetite and shares there is a steep cognitive decline. He will not leave the house. He has an appt for 4/4 but will likely not attend as he will refuse to go.    Care Gaps:    Health Maintenance Due   Topic Date Due     MICROALBUMIN  Never done     PARATHYROID  Never done     PHOSPHORUS  Never done     COVID-19 Vaccine (1) Never done     ZOSTER IMMUNIZATION (2 of 3) 03/27/2014     HEMOGLOBIN  10/07/2014     MEDICARE ANNUAL WELLNESS VISIT  04/20/2017     ADVANCE CARE PLANNING  10/03/2017     DTAP/TDAP/TD IMMUNIZATION (3 - Td or Tdap) 08/29/2021     INFLUENZA VACCINE (1) 09/01/2021       Postponed to next conversation     Goals addressed this encounter:   Goals Addressed                    This Visit's Progress       Functional (pt-stated)   50%      Goal Statement: I will reach out to the [facility] for caregiver support and visit my daughter once weekly for the next 6 months  Date Goal set: 02/11/22  Barriers: psycho social barriers  Strengths: pt is caring for her    Date to Achieve By: 08/11/22  Patient expressed understanding of goal: yes    Action steps to achieve this goal:    1. I will reach out to [facility] at [phone] for caregiver support  2. I will visit my daughter one time per week  3. I will keep in contact with care coordination              Intervention/Education provided during outreach: [facility] will send message to PCP re: patient's  and potential home care          Plan:     Care Coordinator will follow up in 1 week.    NORMAN Peoples   [facility] Care Coordination  Tel: [phone]

## 2022-03-18 NOTE — PROGRESS NOTES
Clinic Care Coordination Contact  [facility]/Voicemail       Clinical Data: Care Coordinator Outreach  Outreach attempted x 1.  Left message on patient's voicemail with call back information and requested return call.  Plan: Care Coordinator will try to reach patient again in 3-5 business days.    Samreen Roque [facility] Care Coordination  Tel: [phone]

## 2022-03-25 ENCOUNTER — PATIENT OUTREACH (OUTPATIENT)
Dept: NURSING | Facility: CLINIC | Age: 82
End: 2022-03-25
Payer: COMMERCIAL

## 2022-03-25 NOTE — PROGRESS NOTES
"    Clinic Care Coordination Contact    Follow Up Progress Note      Assessment: [facility] called patient to follow up.   She shares the following:      Patient's  is not doing as well today but \"Has good days and not so good days\"    He gets a premier drink and vitamins. She fe makes sure he at least is drinking this.    He wont eat fruits and vegetables. He like beets and bananas. [facility] discussed letting him guide what he eats- he may eat more this way.    Would like home care to come out to assess him- would like someone to come after the 4th.    [facility] left message for accent care to determine why patient has not gotten call to schedule yet.  Patient has been going to the AIT group- shares she really likes the leader and finds the group to be really helpful    Care Gaps:    Health Maintenance Due   Topic Date Due     MICROALBUMIN  Never done     PARATHYROID  Never done     PHOSPHORUS  Never done     COVID-19 Vaccine (1) Never done     ZOSTER IMMUNIZATION (2 of 3) 03/27/2014     HEMOGLOBIN  10/07/2014     MEDICARE ANNUAL WELLNESS VISIT  04/20/2017     ADVANCE CARE PLANNING  10/03/2017     DTAP/TDAP/TD IMMUNIZATION (3 - Td or Tdap) 08/29/2021     INFLUENZA VACCINE (1) 09/01/2021     Not addressed at this visit    Goals addressed this encounter:   Goals Addressed                    This Visit's Progress       Functional (pt-stated)   60%      Goal Statement: I will reach out to the [facility] for caregiver support and visit my daughter once weekly for the next 6 months  Date Goal set: 02/11/22  Barriers: psycho social barriers  Strengths: pt is caring for her   Date to Achieve By: 08/11/22  Patient expressed understanding of goal: yes    Action steps to achieve this goal:    1. I will reach out to [facility] at [phone] for caregiver support  2. I will visit my daughter one time per week  3. I will keep in contact with care coordination              Intervention/Education provided " during outreach: SWCC reviewed goals with patient     Outreach Frequency: monthly        Plan:     Care Coordinator will follow up in 2 weeks.    Samreen Roque ANISA   [facility] Care Coordination  Tel: [phone]

## 2022-04-14 ENCOUNTER — PATIENT OUTREACH (OUTPATIENT)
Dept: CARE COORDINATION | Facility: CLINIC | Age: 82
End: 2022-04-14
Payer: COMMERCIAL

## 2022-04-14 ASSESSMENT — ACTIVITIES OF DAILY LIVING (ADL): DEPENDENT_IADLS:: INDEPENDENT

## 2022-04-14 NOTE — PROGRESS NOTES
"Clinic Care Coordination Contact  Follow Up Progress Note     Enrollment Date: 2/11/2022     Assessment: [facility] outreached to pt on this date to review goals and assess for needs.     Pt reports \"We are doing really well.\" Pt states she does not need anything and actually specifically requested to disenroll from Care Coordination at this time.      Goals addressed this encounter: none. Goals deleted.     Intervention/Education provided during outreach: Encouraged pt to outreach to clinic/care coordination as needed should any needs arise. Pt expressed understanding and agreed.      Plan: Goal deleted and Care Coordination episode resolved. Provided pt with contact number for new Care Coordinator coming on board assigned to [facility]. No further outreaches will be made at this time unless a new referral is made or a change in the pt's status occurs. Patient was provided with this writer's contact information and encouraged to call with any questions or concerns.     Chintan Sanders [facility]  Clinic Care Coordinator  [facility]  [facility]  [phone]  [email or web address]        "

## 2022-08-15 DIAGNOSIS — I10 BENIGN ESSENTIAL HYPERTENSION: ICD-10-CM

## 2022-08-15 RX ORDER — LOSARTAN POTASSIUM 50 MG/1
50 TABLET ORAL DAILY
Qty: 90 TABLET | Refills: 1 | Status: SHIPPED | OUTPATIENT
Start: 2022-08-15 | End: 2023-02-02

## 2022-09-16 ENCOUNTER — VIRTUAL VISIT (OUTPATIENT)
Dept: INTERNAL MEDICINE | Facility: CLINIC | Age: 82
End: 2022-09-16
Payer: COMMERCIAL

## 2022-09-16 DIAGNOSIS — U07.1 INFECTION DUE TO 2019 NOVEL CORONAVIRUS: Primary | ICD-10-CM

## 2022-09-16 PROCEDURE — 99443 PR PHYSICIAN TELEPHONE EVALUATION 21-30 MIN: CPT | Mod: 95 | Performed by: INTERNAL MEDICINE

## 2022-09-16 RX ORDER — ONDANSETRON 4 MG/1
4 TABLET, FILM COATED ORAL EVERY 8 HOURS PRN
Qty: 15 TABLET | Refills: 0 | Status: SHIPPED | OUTPATIENT
Start: 2022-09-16

## 2022-09-16 NOTE — PROGRESS NOTES
Roxana is a 82 year old who is being evaluated via a billable telephone visit.      What phone number would you like to be contacted at? [phone]  How would you like to obtain your AVS? Mail a copy    Assessment & Plan     Infection due to 2019 novel coronavirus  82-year-old woman with hypertension and stage IIIb chronic kidney disease who developed chills and cough on Tuesday of September 13.  Tested positive for COVID.  Worsening sore throat and now feeling nauseated with recurrent emesis.  Having difficulty keeping food or fluid down.  Denies feeling short of breath and no chest pain.  She feels weak with fatigue.  Little appetite.  Using Tylenol.  She chose to be never immunized.  Risk score of 6 based on age, hypertension and chronic kidney disease.  She is at risk for progression of symptoms and would benefit from treatment with Paxlovid.  Dose will need to be adjusted because of decreased kidney function with GFR around 44.  This has been sent to her pharmacy and she should start the medication ASAP.  I will also get her Zofran 4 mg every 8 hours to use for nausea.  She can continue acetaminophen up to 3000 mg daily.  We also discussed the need to go to the ER should she continue to have problems holding down fluids.  She is at risk for dehydration and understands that she may need to be evaluated and would benefit from IV fluids if this continues.  She is instructed to quarantine for 10 days.  She and family member should keep masks on and keep distance as much as possible.  - nirmatrelvir and ritonavir (PAXLOVID) therapy pack; Take 2 tablets by mouth 2 times daily for 5 days (Take 1 tablet of Nirmatelvir and 1 tablet of Ritonavir twice daily for 5 days)  - ondansetron (ZOFRAN) 4 MG tablet; Take 1 tablet (4 mg) by mouth every 8 hours as needed for nausea or vomiting                 Return if symptoms worsen or fail to improve.    Tk Hartley MD  [facility]  JOSE Schmidt is a 82 year old, presenting for the following health issues: Tested positive for COVID 3 days ago and is experiencing sore throat with cough chills and fatigue with worsening nausea and emesis.  See assessment and plan for details  Suspected Covid      HPI       COVID-19 Symptom Review  How many days ago did these symptoms start? 3    Are any of the following symptoms significant for you?    New or worsening difficulty breathing? No    Worsening cough? Yes, it's a dry cough.     Fever or chills? Yes, I felt feverish or had chills.    Headache: No    Sore throat: YES    Chest pain: No    Diarrhea: No    Body aches? YES    What treatments has patient tried? Acetaminophen   Does patient live in a nursing home, group home, or shelter? No  Does patient have a way to get food/medications during quarantined? Yes, I have a friend or family member who can help me.                    Review of Systems   See above      Objective    Vitals - Patient Reported  Temperature (Patient Reported): 99.4  F (37.4  C)        Physical Exam   Exam is limited during telephone visit.  No obvious respiratory difficulty, completing full sentences without running out of breath        Phone call duration: 21 minutes

## 2023-01-30 DIAGNOSIS — I10 BENIGN ESSENTIAL HYPERTENSION: ICD-10-CM

## 2023-02-02 RX ORDER — LOSARTAN POTASSIUM 50 MG/1
TABLET ORAL
Qty: 90 TABLET | Refills: 0 | Status: SHIPPED | OUTPATIENT
Start: 2023-02-02 | End: 2023-03-21

## 2023-02-02 NOTE — TELEPHONE ENCOUNTER
Routing refill request to provider for review/approval because:  --Labs out of range:  Scr.    --Last visit:  2/7/2022 Raffy.    --Future Visit:   Next 5 appointments (look out 90 days)    Mar 21, 2023  7:30 AM  (Arrive by 7:10 AM)  Provider Visit with Gloria Akbar NP  [facility] [address] [place] [id number] [phone]          --Last Written Prescription:     Disp Refills Start End AELXANDRE   losartan (COZAAR) 50 MG tablet 90 tablet 1 8/15/2022  No   Sig - Route: Take 1 tablet (50 mg) by mouth daily -        Creatinine   Date Value Ref Range Status   02/02/2022 1.23 (H) 0.52 - 1.04 mg/dL Final   09/03/2019 1.05 (H) 0.52 - 1.04 mg/dL Final   04/20/2016 0.98 0.52 - 1.04 mg/dL Final   10/07/2013 1.24 (H) 0.52 - 1.04 mg/dL Final   10/06/2013 1.03 0.52 - 1.04 mg/dL Final   04/25/2013 0.93 0.52 - 1.04 mg/dL Final

## 2023-02-16 ENCOUNTER — TRANSFERRED RECORDS (OUTPATIENT)
Dept: HEALTH INFORMATION MANAGEMENT | Facility: CLINIC | Age: 83
End: 2023-02-16

## 2023-03-21 ENCOUNTER — OFFICE VISIT (OUTPATIENT)
Dept: FAMILY MEDICINE | Facility: CLINIC | Age: 83
End: 2023-03-21
Payer: COMMERCIAL

## 2023-03-21 VITALS
BODY MASS INDEX: 29.02 KG/M2 | DIASTOLIC BLOOD PRESSURE: 78 MMHG | HEART RATE: 63 BPM | TEMPERATURE: 97.8 F | RESPIRATION RATE: 17 BRPM | HEIGHT: 64 IN | WEIGHT: 170 LBS | SYSTOLIC BLOOD PRESSURE: 157 MMHG | OXYGEN SATURATION: 99 %

## 2023-03-21 DIAGNOSIS — Z13.220 SCREENING FOR HYPERLIPIDEMIA: ICD-10-CM

## 2023-03-21 DIAGNOSIS — I83.11 VARICOSE VEINS OF RIGHT LOWER EXTREMITY WITH INFLAMMATION: Primary | ICD-10-CM

## 2023-03-21 DIAGNOSIS — I10 BENIGN ESSENTIAL HYPERTENSION: ICD-10-CM

## 2023-03-21 DIAGNOSIS — N18.32 STAGE 3B CHRONIC KIDNEY DISEASE (H): ICD-10-CM

## 2023-03-21 LAB
ANION GAP SERPL CALCULATED.3IONS-SCNC: 12 MMOL/L (ref 7–15)
BUN SERPL-MCNC: 24.8 MG/DL (ref 8–23)
CALCIUM SERPL-MCNC: 10 MG/DL (ref 8.8–10.2)
CHLORIDE SERPL-SCNC: 104 MMOL/L (ref 98–107)
CHOLEST SERPL-MCNC: 246 MG/DL
CREAT SERPL-MCNC: 1.23 MG/DL (ref 0.51–0.95)
CREAT UR-MCNC: 115 MG/DL
DEPRECATED HCO3 PLAS-SCNC: 25 MMOL/L (ref 22–29)
GFR SERPL CREATININE-BSD FRML MDRD: 44 ML/MIN/1.73M2
GLUCOSE SERPL-MCNC: 94 MG/DL (ref 70–99)
HDLC SERPL-MCNC: 80 MG/DL
HGB BLD-MCNC: 12.8 G/DL (ref 11.7–15.7)
LDLC SERPL CALC-MCNC: 150 MG/DL
MICROALBUMIN UR-MCNC: <12 MG/L
MICROALBUMIN/CREAT UR: NORMAL MG/G{CREAT}
NONHDLC SERPL-MCNC: 166 MG/DL
PHOSPHATE SERPL-MCNC: 3 MG/DL (ref 2.5–4.5)
POTASSIUM SERPL-SCNC: 4.8 MMOL/L (ref 3.4–5.3)
PTH-INTACT SERPL-MCNC: 35 PG/ML (ref 15–65)
SODIUM SERPL-SCNC: 141 MMOL/L (ref 136–145)
TRIGL SERPL-MCNC: 82 MG/DL

## 2023-03-21 PROCEDURE — 80048 BASIC METABOLIC PNL TOTAL CA: CPT | Performed by: NURSE PRACTITIONER

## 2023-03-21 PROCEDURE — 36415 COLL VENOUS BLD VENIPUNCTURE: CPT | Performed by: NURSE PRACTITIONER

## 2023-03-21 PROCEDURE — 82570 ASSAY OF URINE CREATININE: CPT | Performed by: NURSE PRACTITIONER

## 2023-03-21 PROCEDURE — 84100 ASSAY OF PHOSPHORUS: CPT | Performed by: NURSE PRACTITIONER

## 2023-03-21 PROCEDURE — 83970 ASSAY OF PARATHORMONE: CPT | Performed by: NURSE PRACTITIONER

## 2023-03-21 PROCEDURE — 99215 OFFICE O/P EST HI 40 MIN: CPT | Performed by: NURSE PRACTITIONER

## 2023-03-21 PROCEDURE — 85018 HEMOGLOBIN: CPT | Performed by: NURSE PRACTITIONER

## 2023-03-21 PROCEDURE — 80061 LIPID PANEL: CPT | Performed by: NURSE PRACTITIONER

## 2023-03-21 PROCEDURE — 82043 UR ALBUMIN QUANTITATIVE: CPT | Performed by: NURSE PRACTITIONER

## 2023-03-21 RX ORDER — LOSARTAN POTASSIUM 50 MG/1
50 TABLET ORAL DAILY
Qty: 90 TABLET | Refills: 3 | Status: SHIPPED | OUTPATIENT
Start: 2023-03-21 | End: 2023-12-06

## 2023-03-21 NOTE — LETTER
March 27, 2023      Roxana Karimi  [address]  [facility] [address]            Dear ,    We are writing to inform you of your test results.    Kidney function is stable.     Cholesterol level is also stable.   All other labs are normal.       Resulted Orders   Albumin Random Urine Quantitative with Creat Ratio   Result Value Ref Range    Creatinine Urine mg/dL 115.0 mg/dL      Comment:      The reference ranges have not been established in urine creatinine. The results should be integrated into the clinical context for interpretation.    Albumin Urine mg/L <12.0 mg/L      Comment:      The reference ranges have not been established in urine albumin. The results should be integrated into the clinical context for interpretation.    Albumin Urine mg/g Cr        Comment:      Unable to calculate, urine albumin and/or urine creatinine is outside detectable limits.  Microalbuminuria is defined as an albumin:creatinine ratio of 17 to 299 for males and 25 to 299 for females. A ratio of albumin:creatinine of 300 or higher is indicative of overt proteinuria.  Due to biologic variability, positive results should be confirmed by a second, first-morning random or 24-hour timed urine specimen. If there is discrepancy, a third specimen is recommended. When 2 out of 3 results are in the microalbuminuria range, this is evidence for incipient nephropathy and warrants increased efforts at glucose control, blood pressure control, and institution of therapy with an angiotensin-converting-enzyme (ACE) inhibitor (if the patient can tolerate it).     Parathyroid Hormone Intact   Result Value Ref Range    Parathyroid Hormone Intact 35 15 - 65 pg/mL    Narrative    This result was obtained with the Roche Elecsys PTH STAT assay.   This reference range differs from PTH assays used in other [facility] laboratories.   Phosphorus   Result Value Ref Range    Phosphorus 3.0 2.5 - 4.5 mg/dL   Hemoglobin   Result Value  Ref Range    Hemoglobin 12.8 11.7 - 15.7 g/dL   BASIC METABOLIC PANEL   Result Value Ref Range    Sodium 141 136 - 145 mmol/L    Potassium 4.8 3.4 - 5.3 mmol/L    Chloride 104 98 - 107 mmol/L    Carbon Dioxide (CO2) 25 22 - 29 mmol/L    Anion Gap 12 7 - 15 mmol/L    Urea Nitrogen 24.8 (H) 8.0 - 23.0 mg/dL    Creatinine 1.23 (H) 0.51 - 0.95 mg/dL    Calcium 10.0 8.8 - 10.2 mg/dL    Glucose 94 70 - 99 mg/dL    GFR Estimate 44 (L) >60 mL/min/1.73m2      Comment:      eGFR calculated using 2021 CKD-EPI equation.   Lipid panel reflex to direct LDL Non-fasting   Result Value Ref Range    Cholesterol 246 (H) <200 mg/dL    Triglycerides 82 <150 mg/dL    Direct Measure HDL 80 >=50 mg/dL    LDL Cholesterol Calculated 150 (H) <=100 mg/dL    Non HDL Cholesterol 166 (H) <130 mg/dL    Narrative    Cholesterol  Desirable:  <200 mg/dL    Triglycerides  Normal:  Less than 150 mg/dL  Borderline High:  150-199 mg/dL  High:  200-499 mg/dL  Very High:  Greater than or equal to 500 mg/dL    Direct Measure HDL  Female:  Greater than or equal to 50 mg/dL   Male:  Greater than or equal to 40 mg/dL    LDL Cholesterol  Desirable:  <100mg/dL  Above Desirable:  100-129 mg/dL   Borderline High:  130-159 mg/dL   High:  160-189 mg/dL   Very High:  >= 190 mg/dL    Non HDL Cholesterol  Desirable:  130 mg/dL  Above Desirable:  130-159 mg/dL  Borderline High:  160-189 mg/dL  High:  190-219 mg/dL  Very High:  Greater than or equal to 220 mg/dL       If you have any questions or concerns, please call the clinic at the number listed above.       Sincerely,      Gloria Akbar NP // [facility]

## 2023-03-21 NOTE — PROGRESS NOTES
"  Assessment & Plan     (I83.11) Varicose veins of right lower extremity with inflammation  (primary encounter diagnosis)  Comment:   Plan: Vascular Surgery Referral        Discussed wearing her compression stockings.  Area of redness is likely venous stasis dermatitis.  Will refer to vein specialist.     (N18.32) Stage 3b chronic kidney disease (H)  Comment: stable  Plan: Albumin Random Urine Quantitative with Creat         Ratio, Parathyroid Hormone Intact, Phosphorus,         Hemoglobin, BASIC METABOLIC PANEL        The current medical regimen is effective;  continue present plan and medications.     (Z13.220) Screening for hyperlipidemia  Comment:   Plan: Lipid panel reflex to direct LDL Non-fasting            (I10) Benign essential hypertension  Comment: at goal per home readings  Plan: BASIC METABOLIC PANEL, losartan (COZAAR) 50 MG         tablet        The current medical regimen is effective;  continue present plan and medications.         45 minutes spent on the date of the encounter doing chart review, patient visit and documentation        BMI:   Estimated body mass index is 29.18 kg/m  as calculated from the following:    Height as of this encounter: 1.626 m (5' 4\").    Weight as of this encounter: 77.1 kg (170 lb).   Weight management plan: Discussed healthy diet and exercise guidelines        No follow-ups on file.    Gloria Akbar NP  [facility]    Long Schmidt is a 83 year old, presenting for the following health issues:  Varicose Vein and cold hands      History of Present Illness       Reason for visit:  Varicose veins  cold hands    She eats 2-3 servings of fruits and vegetables daily.She consumes 1 sweetened beverage(s) daily.She exercises with enough effort to increase her heart rate 30 to 60 minutes per day.  She exercises with enough effort to increase her heart rate 3 or less days per week.   She is taking medications regularly.     She is caring for " "her  with Alzheimer's disease, is unable to leave him alone.    Home care is now starting to see him.    She has a mildly red spot on her right ankle near a large varicose vein.  This area feels achy and burns occasionally.  Triamcinolone ointment helps a bit.  She has not been consistent about wearing her compression stockings.          Review of Systems         Objective    BP (!) 157/78 (BP Location: Right arm, Patient Position: Sitting, Cuff Size: Adult Regular)   Pulse 63   Temp 97.8  F (36.6  C) (Temporal)   Resp 17   Ht 1.626 m (5' 4\")   Wt 77.1 kg (170 lb)   SpO2 99%   BMI 29.18 kg/m    Body mass index is 29.18 kg/m .  Physical Exam   GENERAL: healthy, alert and no distress  RESP: lungs clear to auscultation - no rales, rhonchi or wheezes  CV: regular rate and rhythm, normal S1 S2, no S3 or S4, no murmur, click or rub, no peripheral edema and peripheral pulses strong  MS: varicose vein right lower leg  SKIN: mildly erythematous patch medial distal right LE  PSYCH: mentation appears normal, affect normal/bright                    "

## 2023-04-20 ENCOUNTER — ANCILLARY PROCEDURE (OUTPATIENT)
Dept: ULTRASOUND IMAGING | Facility: CLINIC | Age: 83
End: 2023-04-20
Attending: SURGERY
Payer: COMMERCIAL

## 2023-04-20 ENCOUNTER — TELEPHONE (OUTPATIENT)
Dept: VASCULAR SURGERY | Facility: CLINIC | Age: 83
End: 2023-04-20

## 2023-04-20 ENCOUNTER — OFFICE VISIT (OUTPATIENT)
Dept: VASCULAR SURGERY | Facility: CLINIC | Age: 83
End: 2023-04-20
Attending: NURSE PRACTITIONER
Payer: COMMERCIAL

## 2023-04-20 DIAGNOSIS — I83.11 VARICOSE VEINS OF RIGHT LOWER EXTREMITY WITH INFLAMMATION: ICD-10-CM

## 2023-04-20 PROCEDURE — 99203 OFFICE O/P NEW LOW 30 MIN: CPT | Performed by: SURGERY

## 2023-04-20 PROCEDURE — 93971 EXTREMITY STUDY: CPT | Mod: RT | Performed by: SURGERY

## 2023-04-20 RX ORDER — TRIAMCINOLONE ACETONIDE 1 MG/G
OINTMENT TOPICAL
COMMUNITY
Start: 2023-02-16

## 2023-04-20 NOTE — PATIENT INSTRUCTIONS
Pre-Procedure Instructions:                                        VNUS Closure  You are having a VNUS Closure. One or more of your veins will be closed with radiofrequency heating.    Insurance  Precertification and/or referral authorization may be required by your insurance company.  We will call your insurance company to verify benefits for the medically necessary part of your procedure.    Your Current Medications and Allergies  Are you on blood thinner medications? (Aspirin, Plavix, Coumadin, Eliquis, Xarelto) Please discuss this with your surgeon.  Are you sensitive to latex or adhesives used for fake fingernails? Please let us know!     Driving Escort and   Please arrange to have a trusted adult (18 years old or older) drive you to and from the clinic.  For your safety, we recommend you have a trusted adult to stay with you until the next morning.    Your Health  If you have a change in your health before the procedure, contact our office immediately.  (For example: cold symptoms, cough, urinary tract infection, fever, flu symptoms).  A pre-procedure physical is not required.    Note  It is sometimes necessary to adjust the procedure schedule due to emergencies. We greatly appreciate your flexibility and understanding in this matter.  Compression hose are needed following this procedure.  __________________________________________________________________________________    Check List:  The Morning of Your Procedure  ___1.  Please do not apply anything on your leg(s) or shave the day of your procedure.  ___2.  You may take your normal medications the day of your procedure.  ___3.  It is recommended you eat a light breakfast or lunch on the day of your procedure.  ___4.  Wear comfortable loose-fitting clothing and wide-fitting shoes (i.e. tennis shoes, slip-ons).  ___5.  Please arrive at our clinic at the specified time given by the nurse.  ___6.  You will sign an affirmation of informed  consent.  ___7.  Bring your pre-procedure sedation medication (lorazepam and clonidine) with you to the clinic. One hour              before your procedure, you will be instructed to take these medications. The lorazepam (Ativan) lowers              anxiety and sedates you; the clonidine makes the lorazepam more effective. Everyone's body processes              these medications differently. Therefore, reactions to these medications vary. Some people stay awake and              some people sleep through the whole procedure. You may not remember everything about the procedure              or the day. You do not want to make any big decisions for the rest of the day.  ___8.  Bring the appropriate compression hose (i.e.thigh high).           The Day of Your Procedure:                  VNUS Closure  In the Exam Room  A nurse will bring you back to an exam room with your family member or friend. This is when your informed consent will be signed, and you will take your pre-procedure medications.  You will be asked to remove everything from the waist down, including undergarments. You will then put on a hospital gown or shorts and blue booties.  Your surgeon will come in to answer any questions and dilshad the appropriate leg(s) with a marker.  You will be taken to the restroom to empty your bladder before going into the procedure room.    In the Procedure Room  You will be escorted to the procedure room. You will lie on a procedure table covered with a sheet or blanket.  A nurse will put a blood pressure cuff on your arm and a pulse/oxygen monitor on a finger. Your vital signs will be monitored every 15 minutes.  Your gown will be pulled up slightly and the groin exposed for a short period of time. The surgeon's assistant will clean your foot, leg, and groin with an antibacterial solution. We will get you covered up as quickly as possible!  Sterile towels and blue drapes will be used to cover you and the table. You will be  asked to keep your hands under the blue drapes during the procedure.    The Procedure  The surgeon will visualize your veins with an ultrasound machine. He or she will then numb your skin and access the vein. A catheter is passed up the vein and positioned with ultrasound guidance. The table will then be tipped head down.  Once the catheter is in the correct position, medication will be injected to numb your leg. You will feel some needle sticks and may feel discomfort as the medication goes in. Once this is done, you should not experience significant discomfort. But if you do, please let us know and more numbing medication can be injected. As the catheter sends out heat, the vein closes off and the catheter is withdrawn.        Post-Procedure  Once the procedure is done, your leg will be washed with warm water and dried. You will have one or more small bandages covering your incisions. Your compression hose will be put on or an ACE wrap from your toes to groin. If the ACE wrap feels too tight or binds, please elevate your leg and loosen it.  You will be offered something to drink and a light snack.  You will rest with your leg(s) elevated for approximately 30 minutes. Your friend or family member may join you.   For your safety, you will be accompanied to your car by a staff member.    Post-Procedure Instructions:                          VNUS Closure    Post-Op Day Zero - The Day of Your Procedure:  1. Medication for Pain Control and Inflammation Control   - The numbing medication injected during your procedure will last for several hours. The pre-procedure    tablets may make you very sleepy and you might not remember everything from the procedure or from    the day. This will usually wear off by the next day.   - Ibuprofen:  If tolerated, take ibuprofen (e.g., Advil) to reduce inflammation whether or not you have    pain. For three days, take two tablets (200mg each) with every meal and at bedtime with a snack.  If    you are not able to take Ibuprofen, Tylenol is another option.   - You may resume taking any medications you were taking before your procedure.  2. Activity   - You may be up as tolerated when the sedation wears off. Elevate if possible when not walking.  3. Bandages   - You will have one or more small bandages covering the incision site(s) where we accessed the vein(s).    Keep your bandages on and dry for 48 hours. Compression hose should be worn continuously over    the bandages for the first 24 hours.  4. Incisions   - Bleeding: You may see some incision sites that are oozing through the bandages. This is not unusual    and can be managed with Rest, Ice, Compression and Elevation (RICE). Apply ice and firm pressure    directly to the site that is bleeding and rest with your leg(s) elevated above your heart for 20-30 minutes.    Post-Op Day One:  1. Medication   - Ibuprofen:  Continue the same as the Day of Your Procedure.  2. Activity   - Walk as tolerated. Resume your normal daily walking activities. If it hurts, stop. We encourage you to               walk. Elevate if possible when not walking.  3. Bandages and Compression   - After 24 hours, you may remove your compression hose to take a shower. Please keep your bandage(s)    on and intact. You may want to cover your bandage(s) to ensure it remains dry during your shower.    Reapply your compression hose after your shower and wear during waking hours only.  4. Driving   - You may resume driving when you can do so safely.    Post-Op Day Two:   1. Medication  - Ibuprofen:  Continue the same as the Day of Your Procedure.  2.  Activity   - Walk as tolerated. Elevate if possible when not walking.  3. Bandages and Compression  - You may remove your bandage after 48 hours. Continue wearing your compression hose during waking hours only for a total of seven days following your procedure.  4. Incisions   - Your leg(s) may be bruised at or near incision site(s)  and possibly have numb spots. This is normal.   5. Call Us If:   - You see any areas on your leg that are red and angry in appearance.   - You notice any drainage that is milky or cloudy in appearance or that has a foul odor.   - You run a temperature of 100.5 or greater.      Post-Op Day Three:  You will have a follow up appointment 2-4 days post-procedure. At this appointment, you will have an ultrasound and we will check your incisions.      The Two Weeks Following Your Procedure  1.  Skin Care              - Do not use any lotions, creams, or powders on your incisions for 14 days or until the incisions have healed.              - Do not soak in a bathtub, hot tub or go swimming for 14 days or until your incisions have healed.  2.  Medications   - You may use ibuprofen or acetaminophen (e.g., Tylenol) as needed for pain or discomfort.  3.  Activity   - Do not lift over 25 pounds. After about two weeks you may resume exercise such as aerobics, running,    tennis or weightlifting. Use your common sense and ease back into your exercise routine slowly.   - You may feel a cord-like tightness along the inside of your leg. Gentle stretching can be helpful.  4. Compression Hose   - Your doctor may instruct you to wear compression for longer than seven days; please    follow your doctor's instructions. As a comfort measure, you may choose to wear compression for    longer than required.  5.  Travel   - Do not fly in an airplane for 14 days after your procedure.  If you have a long car trip planned within    two to three weeks following your procedure, stop and walk for a few minutes every two hours.    Periodic ankle pumps during the ride may be helpful.    Six Week Appointment  - At your six-week appointment, you will see your surgeon for an exam and evaluation. This office visit               will be scheduled when you return for post-op day three return appointment.     Return to Work  1.  If you work outside the home,  you may return to work the next day depending on the extent of your    procedure, how you tolerate it, and the type of work you perform.  2.  Paperwork: If your employer requires paperwork or you would like a letter written to your employer, please    let us know. We will complete disability type forms at no charge. Please allow five business days for    forms to be completed.     [employer] last reviewed this educational content on 11/1/2019 2000-2021 [employer]. All rights reserved. This information is not intended as a substitute for professional medical care. Always follow your healthcare professional's instructions.     SCLEROTHERAPY AFTER CARE  Immediately:  After treatment, walk for 10-15 minutes before getting in your car.  If your trip home is more than 1 hour, stop and walk around for 5-10 minutes. Avoid sitting or standing for extended periods.   First 24 hours: Wear your compression continuously, even while in bed. After the 24 hours, you may shower if you want to. Put your hose back on, unless you are going to bed. You should NOT wear compression to bed after the first 24 hours. You may fly the next day, but wear your compression.   For 5 days: Wear the compression hose for waking hours only. You may continue to wear them longer than 5 days if you prefer.   For days 5-7: Walking is encouraged, as it promotes efficient circulation in your veins. You may do activities that raise your heart rate, but do NOT run, jog, do high impact aerobics, or weight lifting. After 7 days, no activity restrictions.    Shaving: Wait a few days to shave or apply lotion.   Bathing: Do NOT take hot baths or sit in a hot tub for 7-10 days.    For 1 year: Wear SPF 30 sunscreen on your legs when in the sun. This is very important! It helps prevent darkening of the skin at the injection sites.   Medications: You may resume your usual medications, including aspirin or ibuprofen.    Common Things to Expect  -   Compression must be worn for the first 24 hours and then during the day for 5-7 days.    -  If larger veins are treated with ultrasound-guided sclerotherapy, you will have redness, firmness, tenderness, and swelling.  This firmness and tenderness may take 3-6 months to resolve. Ibuprofen and compression hose will aid in this process.    -  You will have bruising that can last up to 3 weeks. Most fading of the veins will occur between 3 and 6 weeks after treatment.    -  You may notice brown discoloration (hyperpigmentation) at the treatment site.  This should fade with time, but will take 3 months to 1 year to fully heal.     -  Some treated veins may look darker because of trapped blood within the vein. This trapped blood can be removed at a minimum of 1 month following treatment. Larger veins are more likely to develop trapped blood.    -  It is very important for you to use at least SPF 30 sunscreen in order to help prevent the discoloration of your skin.    -  Migraines rarely occur following sclerotherapy, but are more likely in patients with a history of migraines.  Treat as you would any other migraine.

## 2023-04-20 NOTE — NURSING NOTE
Patient Reported symptoms:    Right leg   Heaviness A good bit of the time   Achiness A good bit of the time   Swelling A little of the time   Throbbing Most of the time   Itching None of the time   Appearance Very noticeable   Impact on work/activities Moderately reduced    Left Leg   Heaviness A little of the time   Achiness A good bit of the time   Swelling A little of the time   Throbbing A little of the time   Itching None of the time   Appearance Slightly noticeable   Impact on work/activities Mildly reduced    less than 2 seconds

## 2023-04-20 NOTE — TELEPHONE ENCOUNTER
Pt has 2 questions; zippered compression hose and 2. Whether or not she will be sedated once surgery is scheduled.

## 2023-04-20 NOTE — PROGRESS NOTES
After Visit Follow Up  Spoke with patient regarding process of insurance submission. Informed patient this process could take up to 14 business days, but once approved, you will be contacted to schedule your procedure. Pt did not want to pre schedule at this time.    Handed patient written procedure instructions to review on their own (see After Visit Summary).    Instructed patient if they have any questions for a nurse to give us a call back otherwise a nurse will be calling to review procedure instructions with patient 1-2 weeks prior to their scheduled procedure.    Pt does not have thigh high hose. She will call and check with insurance and give us a call back.     MEGHAN Terrazas  [facility]

## 2023-04-20 NOTE — LETTER
4/20/2023         RE: Roxana Karimi  [address]  [facility] [address]        Dear Colleague,    Thank you for referring your patient, Roxana Karimi, to the [facility] [place]. Please see a copy of my visit note below.    I had the pleasure of seeing Mr. Karimi in the vascular surgery vein clinic today.  She is a very pleasant 83-year-old female who comes to us with pain in the right calf area.  She tells me that about 6 months ago she developed an area of pain and swelling in that region.  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  Previously they did not cause any discomfort.  However, since that episode she has been having increasing aching in that area.    On my examination she has large ropey varicosities in the right lower extremity.  The area in question in the lower calf is of previous thrombophlebitis.  There is still residual tenderness, induration and erythema.    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  The varicose veins in question arises from the incompetent segment of the great saphenous vein.  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    My recommendation would be to do ablation of the right great saphenous vein and also injection sclerotherapy of the cluster of varicose veins in the right lower extremity to prevent recurrent episodes of thrombophlebitis.  I explained the rationale and the plan to the patient and she has verbalized understanding      Again, thank you for allowing me to participate in the care of your patient.        Sincerely,        Damir Horn MD

## 2023-04-20 NOTE — TELEPHONE ENCOUNTER
Called and spoke to patient. Discussed sedation medication and whether or not to take them with this procedure. I did inform patient even without sedation medications her right leg will be numb from the medication being injected. Patient would prefer to have a  anyways. Explained to patient once the procedure is scheduled a nurse will call and discuss more with her about the medications and what to expect.     Patient asked about order zipper compression hose online due to not being able to bend down and put on the other compression hose. Told the patient that would be ok, as long as they are the appropriate strength 20-30-some compression is better than none.     Patient verbalized understanding of all information. No further questions or concerns at this time.

## 2023-04-20 NOTE — PROGRESS NOTES
I had the pleasure of seeing Mr. Karimi in the vascular surgery vein clinic today.  She is a very pleasant 83-year-old female who comes to us with pain in the right calf area.  She tells me that about 6 months ago she developed an area of pain and swelling in that region.  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  Previously they did not cause any discomfort.  However, since that episode she has been having increasing aching in that area.    On my examination she has large ropey varicosities in the right lower extremity.  The area in question in the lower calf is of previous thrombophlebitis.  There is still residual tenderness, induration and erythema.    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  The varicose veins in question arises from the incompetent segment of the great saphenous vein.  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    My recommendation would be to do ablation of the right great saphenous vein and also injection sclerotherapy of the cluster of varicose veins in the right lower extremity to prevent recurrent episodes of thrombophlebitis.  I explained the rationale and the plan to the patient and she has verbalized understanding

## 2023-04-21 ENCOUNTER — TELEPHONE (OUTPATIENT)
Dept: VASCULAR SURGERY | Facility: CLINIC | Age: 83
End: 2023-04-21
Payer: COMMERCIAL

## 2023-04-21 NOTE — TELEPHONE ENCOUNTER
Called pt in regards to some follow up questions she had with my colleague yesterday. Did not realize one of the other nurses just spoke with pt yesterday regarding questions. Reviewed over the procedure plan with pt and told her to let us know if she needs help getting the compression stockings.    Pt in agreement with Randi FOWLER, surgery scheduler, calling her in 2-3 weeks once an approval has been received. 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 Pt would prefer to still take the sedation medication.    Britany Medley RN  [facility]  Vein Clinic

## 2023-05-08 ENCOUNTER — TELEPHONE (OUTPATIENT)
Dept: NURSING | Facility: CLINIC | Age: 83
End: 2023-05-08
Payer: COMMERCIAL

## 2023-05-08 NOTE — TELEPHONE ENCOUNTER
Pt requests message sent to PCP Raffy re: varicose vein treatment. Pt received letter from [facility] that sclerotherapy was denied, and recommended endovenous ablation therapy was approved. Pt does not know anything about the procedure and states that since her large (saphenous) vein is working, she is hesitant to have this procedure done. Pt trusts PCP and asks for opinion to proceed or not.    If PCP would like to schedule a follow up appointment to discuss procedure/s, pt is available very early in AM.  has Alzheimer's and would like to get in clinic before he wakes up.    Please call pt for update [phone]    Yanni Reid RN/[place] Nurse Advisor

## 2023-05-08 NOTE — TELEPHONE ENCOUNTER
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  The varicose veins in question arises from the incompetent segment of the great saphenous vein.  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    So I would agree that she should proceed with the ablation.

## 2023-06-08 ENCOUNTER — TELEPHONE (OUTPATIENT)
Dept: FAMILY MEDICINE | Facility: CLINIC | Age: 83
End: 2023-06-08
Payer: COMMERCIAL

## 2023-06-08 NOTE — TELEPHONE ENCOUNTER
Isatu Akbar --    Patient concerned about having a procedure on her legs for varicose veins.   Patient would like to consult with you before agreeing to having this procedure.   Scheduled appointment for August.     YUSUF Lu RN  [facility]

## 2023-11-29 ENCOUNTER — HOSPITAL ENCOUNTER (EMERGENCY)
Facility: CLINIC | Age: 83
Discharge: HOME | End: 2023-11-29
Attending: EMERGENCY MEDICINE | Admitting: EMERGENCY MEDICINE
Payer: COMMERCIAL

## 2023-11-29 ENCOUNTER — APPOINTMENT (OUTPATIENT)
Dept: CT IMAGING | Facility: CLINIC | Age: 83
End: 2023-11-29
Attending: EMERGENCY MEDICINE
Payer: COMMERCIAL

## 2023-11-29 VITALS
BODY MASS INDEX: 28 KG/M2 | HEIGHT: 64 IN | HEART RATE: 77 BPM | SYSTOLIC BLOOD PRESSURE: 167 MMHG | TEMPERATURE: 98.1 F | DIASTOLIC BLOOD PRESSURE: 76 MMHG | OXYGEN SATURATION: 97 % | WEIGHT: 164 LBS | RESPIRATION RATE: 18 BRPM

## 2023-11-29 DIAGNOSIS — S01.81XA FACIAL LACERATION, INITIAL ENCOUNTER: ICD-10-CM
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
## 2023-11-29 LAB
ANION GAP SERPL CALCULATED.3IONS-SCNC: 11 MMOL/L (ref 7–15)
ATRIAL RATE - MUSE: 75 BPM
BASOPHILS # BLD AUTO: 0 10E3/UL (ref 0–0.2)
BASOPHILS NFR BLD AUTO: 1 %
BUN SERPL-MCNC: 23.5 MG/DL (ref 8–23)
CALCIUM SERPL-MCNC: 9.7 MG/DL (ref 8.8–10.2)
CHLORIDE SERPL-SCNC: 106 MMOL/L (ref 98–107)
CREAT SERPL-MCNC: 1.15 MG/DL (ref 0.51–0.95)
DEPRECATED HCO3 PLAS-SCNC: 25 MMOL/L (ref 22–29)
DIASTOLIC BLOOD PRESSURE - MUSE: NORMAL MMHG
EGFRCR SERPLBLD CKD-EPI 2021: 48 ML/MIN/1.73M2
EOSINOPHIL # BLD AUTO: 0.2 10E3/UL (ref 0–0.7)
EOSINOPHIL NFR BLD AUTO: 2 %
ERYTHROCYTE [DISTWIDTH] IN BLOOD BY AUTOMATED COUNT: 13.8 % (ref 10–15)
GLUCOSE SERPL-MCNC: 97 MG/DL (ref 70–99)
HCT VFR BLD AUTO: 36.8 % (ref 35–47)
HGB BLD-MCNC: 11.6 G/DL (ref 11.7–15.7)
IMM GRANULOCYTES # BLD: 0 10E3/UL
IMM GRANULOCYTES NFR BLD: 0 %
INTERPRETATION ECG - MUSE: NORMAL
LYMPHOCYTES # BLD AUTO: 1.2 10E3/UL (ref 0.8–5.3)
LYMPHOCYTES NFR BLD AUTO: 14 %
MCH RBC QN AUTO: 29.4 PG (ref 26.5–33)
MCHC RBC AUTO-ENTMCNC: 31.5 G/DL (ref 31.5–36.5)
MCV RBC AUTO: 93 FL (ref 78–100)
MONOCYTES # BLD AUTO: 0.7 10E3/UL (ref 0–1.3)
MONOCYTES NFR BLD AUTO: 9 %
NEUTROPHILS # BLD AUTO: 6.5 10E3/UL (ref 1.6–8.3)
NEUTROPHILS NFR BLD AUTO: 74 %
NRBC # BLD AUTO: 0 10E3/UL
NRBC BLD AUTO-RTO: 0 /100
P AXIS - MUSE: 65 DEGREES
PLATELET # BLD AUTO: 299 10E3/UL (ref 150–450)
POTASSIUM SERPL-SCNC: 4.3 MMOL/L (ref 3.4–5.3)
PR INTERVAL - MUSE: 134 MS
QRS DURATION - MUSE: 70 MS
QT - MUSE: 382 MS
QTC - MUSE: 426 MS
R AXIS - MUSE: 61 DEGREES
RBC # BLD AUTO: 3.94 10E6/UL (ref 3.8–5.2)
SODIUM SERPL-SCNC: 142 MMOL/L (ref 135–145)
SYSTOLIC BLOOD PRESSURE - MUSE: NORMAL MMHG
T AXIS - MUSE: 63 DEGREES
TROPONIN T SERPL HS-MCNC: 20 NG/L
TROPONIN T SERPL HS-MCNC: 20 NG/L
VENTRICULAR RATE- MUSE: 75 BPM
WBC # BLD AUTO: 8.7 10E3/UL (ref 4–11)

## 2023-11-29 PROCEDURE — 250N000009 HC RX 250: Performed by: EMERGENCY MEDICINE

## 2023-11-29 PROCEDURE — 80048 BASIC METABOLIC PNL TOTAL CA: CPT | Performed by: EMERGENCY MEDICINE

## 2023-11-29 PROCEDURE — 93005 ELECTROCARDIOGRAM TRACING: CPT | Mod: 59

## 2023-11-29 PROCEDURE — 85025 COMPLETE CBC W/AUTO DIFF WBC: CPT | Performed by: EMERGENCY MEDICINE

## 2023-11-29 PROCEDURE — 70450 CT HEAD/BRAIN W/O DYE: CPT

## 2023-11-29 PROCEDURE — 36415 COLL VENOUS BLD VENIPUNCTURE: CPT | Performed by: EMERGENCY MEDICINE

## 2023-11-29 PROCEDURE — 70450 CT HEAD/BRAIN W/O DYE: CPT | Mod: 26 | Performed by: RADIOLOGY

## 2023-11-29 PROCEDURE — 90715 TDAP VACCINE 7 YRS/> IM: CPT | Performed by: EMERGENCY MEDICINE

## 2023-11-29 PROCEDURE — 99285 EMERGENCY DEPT VISIT HI MDM: CPT | Mod: 25

## 2023-11-29 PROCEDURE — 12011 RPR F/E/E/N/L/M 2.5 CM/<: CPT

## 2023-11-29 PROCEDURE — 90471 IMMUNIZATION ADMIN: CPT | Performed by: EMERGENCY MEDICINE

## 2023-11-29 PROCEDURE — 72125 CT NECK SPINE W/O DYE: CPT

## 2023-11-29 PROCEDURE — 84484 ASSAY OF TROPONIN QUANT: CPT | Performed by: EMERGENCY MEDICINE

## 2023-11-29 PROCEDURE — 250N000011 HC RX IP 250 OP 636: Performed by: EMERGENCY MEDICINE

## 2023-11-29 PROCEDURE — 72125 CT NECK SPINE W/O DYE: CPT | Mod: 26 | Performed by: RADIOLOGY

## 2023-11-29 RX ORDER — LIDOCAINE HYDROCHLORIDE AND EPINEPHRINE 10; 10 MG/ML; UG/ML
10 INJECTION, SOLUTION INFILTRATION; PERINEURAL ONCE
Status: COMPLETED | OUTPATIENT
Start: 2023-11-29 | End: 2023-11-29

## 2023-11-29 RX ADMIN — CLOSTRIDIUM TETANI TOXOID ANTIGEN (FORMALDEHYDE INACTIVATED), CORYNEBACTERIUM DIPHTHERIAE TOXOID ANTIGEN (FORMALDEHYDE INACTIVATED), BORDETELLA PERTUSSIS TOXOID ANTIGEN (GLUTARALDEHYDE INACTIVATED), BORDETELLA PERTUSSIS FILAMENTOUS HEMAGGLUTININ ANTIGEN (FORMALDEHYDE INACTIVATED), BORDETELLA PERTUSSIS PERTACTIN ANTIGEN, AND BORDETELLA PERTUSSIS FIMBRIAE 2/3 ANTIGEN 0.5 ML: 5; 2; 2.5; 5; 3; 5 INJECTION, SUSPENSION INTRAMUSCULAR at 04:14

## 2023-11-29 RX ADMIN — LIDOCAINE HYDROCHLORIDE AND EPINEPHRINE 10 ML: 10; 10 INJECTION, SOLUTION INFILTRATION; PERINEURAL at 04:25

## 2023-11-29 ASSESSMENT — ACTIVITIES OF DAILY LIVING (ADL)
ADLS_ACUITY_SCORE: 33
ADLS_ACUITY_SCORE: 35

## 2023-11-29 NOTE — ED NOTES
Patient was excepted at shift change signout with plan for me to follow-up on her CT head and C-spine, labs, recheck blood pressure.  No acute findings in the head or C-spine.  Basic labs reveal a mildly elevated troponin at 20.  Patient denies any chest symptoms, I did order EKG which showed normal sinus rhythm, was read by myself at 4:30 AM.  No acute abnormality was seen.  Repeat troponin at 2 hours remained stable.  The patient seems to be doing well.  She is encouraged to follow-up with primary care in 5 days for recheck and suture removal, and to return to the ER with any new or worsening symptoms, any other concerns.  She verbalizes understanding and is agreeable to the plan.    Dictation Disclaimer: Some of this Note has been completed with voice-recognition dictation software. Although errors are generally corrected real-time, there is the potential for a rare error to be present in the completed chart.       Tiera Villanueva MD  11/29/23 0691

## 2023-11-29 NOTE — ED TRIAGE NOTES
Presents to the ER for head injury. Pt was trying to get out of a recliner without putting the foot rest down, as she was trying to stand up the chair flipped forward causing her to fall and hitting head on corner of TV stand. Laceration and swelling to the L lateral forehead. Bleeding controlled. Pt states its been bleeding for an hour. Denies LOC. Denies neck or back pain. Denies taking any blood thinners.      Triage Assessment (Adult)       Row Name 11/29/23 0128          Triage Assessment    Airway WDL WDL        Respiratory WDL    Respiratory WDL WDL        Skin Circulation/Temperature WDL    Skin Circulation/Temperature WDL WDL        Cardiac WDL    Cardiac WDL WDL        Peripheral/Neurovascular WDL    Peripheral Neurovascular WDL WDL        Cognitive/Neuro/Behavioral WDL    Cognitive/Neuro/Behavioral WDL WDL

## 2023-11-29 NOTE — ED PROVIDER NOTES
"ED Provider Note  [facility]      History     Chief Complaint   Patient presents with    Head Injury     HPI  Roxana Karimi is a 83 year old female with history of hypertension who presents the ED for evaluation of head injury.  She states that she was sitting on her chair, tried to lean forward, and was flipped off the front of her chair, hitting her head on the corner of a TV stand.  She has a laceration/soft tissue swelling to her left forehead.  She denies any loss of consciousness.  No neck pain.  All symptoms related to swelling of the forehead.  She states \"it bled for approximately an hour\".  Does not have any other injuries.  Unsure of last tetanus.  States that she did take her blood pressure medications this morning.    Past Medical History  Past Medical History:   Diagnosis Date    Iritis      Past Surgical History:   Procedure Laterality Date    NO HISTORY OF SURGERY       Acetaminophen (TYLENOL PO)  losartan (COZAAR) 50 MG tablet  magnesium 250 MG tablet  Multiple Vitamins-Minerals (ZINC PO)  Omega-3 Fatty Acids (OMEGA-3 FISH OIL PO)  ondansetron (ZOFRAN) 4 MG tablet  order for DME  triamcinolone (KENALOG) 0.1 % external ointment  TURMERIC PO  vitamin B complex with vitamin C (STRESS TAB) tablet  VITAMIN D, CHOLECALCIFEROL, PO  VITAMIN E PO      Allergies   Allergen Reactions    Codeine Sulfate      hives, swelling     Family History  Family History   Problem Relation Age of Onset    Breast Cancer Mother     Blood Disease Mother         bone marrow depression-bone wouldnt produce hgb    C.A.D. Father     Hypertension Father     Respiratory Maternal Grandfather         asthma    Respiratory Daughter         asthma    Diabetes No family hx of     Cerebrovascular Disease No family hx of     Cancer - colorectal No family hx of      Social History   Social History     Tobacco Use    Smoking status: Never    Smokeless tobacco: Never   Substance Use Topics    Alcohol use: No    " "Drug use: No         A medically appropriate review of systems was performed with pertinent positives and negatives noted in the HPI, and all other systems negative.    Physical Exam   BP: (!) 222/97  Pulse: 100  Temp: 98.1  F (36.7  C)  Resp: 20  Height: 162.6 cm (5' 4\")  Weight: 74.4 kg (164 lb)  SpO2: 100 %  Physical Exam  Vitals and nursing note reviewed.   Constitutional:       General: She is not in acute distress.     Appearance: Normal appearance.   HENT:      Head: Normocephalic.        Nose: Nose normal.   Eyes:      Pupils: Pupils are equal, round, and reactive to light.   Cardiovascular:      Rate and Rhythm: Normal rate and regular rhythm.   Pulmonary:      Effort: Pulmonary effort is normal.   Abdominal:      General: There is no distension.   Musculoskeletal:         General: No deformity. Normal range of motion.      Cervical back: Normal range of motion and neck supple. No rigidity or tenderness.   Skin:     General: Skin is warm.   Neurological:      Mental Status: She is alert and oriented to person, place, and time.   Psychiatric:         Mood and Affect: Mood normal.           ED Course, Procedures, & Data      [facility]    -Laceration Repair    Date/Time: 11/29/2023 10:14 PM    Performed by: Benitez Lay DO  Authorized by: Benitez Lay DO    Risks, benefits and alternatives discussed.      ANESTHESIA (see MAR for exact dosages):     Anesthesia method:  Local infiltration    Local anesthetic:  Lidocaine 1% WITH epi  LACERATION DETAILS     Location:  Face    Face location:  Forehead    Length (cm):  2    Depth (mm):  3    REPAIR TYPE:     Repair type:  Intermediate    EXPLORATION:     Hemostasis achieved with:  Epinephrine    Wound exploration: wound explored through full range of motion and entire depth of wound probed and visualized      Wound extent: no foreign body, no signs of injury, no nerve damage, no tendon damage, no " underlying fracture and no vascular damage      Contaminated: no      TREATMENT:     Area cleansed with:  Saline (chlorehex)    Irrigation solution:  Sterile saline    Irrigation volume:  1000    Irrigation method:  Syringe    Visualized foreign bodies/material removed: no      SKIN REPAIR     Repair method:  Sutures    Suture size:  5-0    Suture material:  Nylon    Suture technique:  Simple interrupted    Number of sutures:  4    APPROXIMATION     Approximation:  Close    POST-PROCEDURE DETAILS     Dressing:  Antibiotic ointment      PROCEDURE    Patient Tolerance:  Patient tolerated the procedure well with no immediate complications                Results for orders placed or performed during the hospital encounter of 11/29/23   CT Head w/o Contrast     Status: None    Narrative    EXAM: CT HEAD W/O CONTRAST, CT CERVICAL SPINE W/O CONTRAST  LOCATION: [facility]  DATE: 11/29/2023    INDICATION: Fall, forehead laceration.  COMPARISON: None.  TECHNIQUE:   1) Routine CT Head without IV contrast. Multiplanar reformats. Dose reduction techniques were used.  2) Routine CT Cervical Spine without IV contrast. Multiplanar reformats. Dose reduction techniques were used.    FINDINGS:   HEAD CT:   INTRACRANIAL CONTENTS: No intracranial hemorrhage, extraaxial collection, or mass effect.  No CT evidence of acute infarct. Mild-to-moderate volume loss and presumed chronic small vessel ischemia.    VISUALIZED ORBITS/SINUSES/MASTOIDS: No intraorbital abnormality. No paranasal sinus mucosal disease. No middle ear or mastoid effusion.    BONES/SOFT TISSUES: Left frontal and supraorbital scalp swelling. No calvarial fracture.    CERVICAL SPINE CT:   VERTEBRA: Retrolisthesis C3 on C4. Alignment is otherwise normal. No acute cervical spine fracture or posttraumatic subluxation. Marked loss of disc space height at C3-C4 and mild changes elsewhere. Mild-to-moderate multilevel facet  arthropathy.    CANAL/FORAMINA: Mild multilevel degenerative canal narrowing. Marked left C3-C4 degenerative foraminal narrowing.    PARASPINAL: No extraspinal abnormality. Visualized lung fields are clear.      Impression    IMPRESSION:  HEAD CT:  1.  Left frontal and periorbital soft tissue swelling. No acute intracranial hemorrhage or calvarial fracture.    2.  Age-related changes.    CERVICAL SPINE CT:  1.  No acute cervical spine fracture.   CT Cervical Spine w/o Contrast     Status: None    Narrative    EXAM: CT HEAD W/O CONTRAST, CT CERVICAL SPINE W/O CONTRAST  LOCATION: [facility]  DATE: 11/29/2023    INDICATION: Fall, forehead laceration.  COMPARISON: None.  TECHNIQUE:   1) Routine CT Head without IV contrast. Multiplanar reformats. Dose reduction techniques were used.  2) Routine CT Cervical Spine without IV contrast. Multiplanar reformats. Dose reduction techniques were used.    FINDINGS:   HEAD CT:   INTRACRANIAL CONTENTS: No intracranial hemorrhage, extraaxial collection, or mass effect.  No CT evidence of acute infarct. Mild-to-moderate volume loss and presumed chronic small vessel ischemia.    VISUALIZED ORBITS/SINUSES/MASTOIDS: No intraorbital abnormality. No paranasal sinus mucosal disease. No middle ear or mastoid effusion.    BONES/SOFT TISSUES: Left frontal and supraorbital scalp swelling. No calvarial fracture.    CERVICAL SPINE CT:   VERTEBRA: Retrolisthesis C3 on C4. Alignment is otherwise normal. No acute cervical spine fracture or posttraumatic subluxation. Marked loss of disc space height at C3-C4 and mild changes elsewhere. Mild-to-moderate multilevel facet arthropathy.    CANAL/FORAMINA: Mild multilevel degenerative canal narrowing. Marked left C3-C4 degenerative foraminal narrowing.    PARASPINAL: No extraspinal abnormality. Visualized lung fields are clear.      Impression    IMPRESSION:  HEAD CT:  1.  Left frontal and periorbital soft  tissue swelling. No acute intracranial hemorrhage or calvarial fracture.    2.  Age-related changes.    CERVICAL SPINE CT:  1.  No acute cervical spine fracture.   Basic metabolic panel     Status: Abnormal   Result Value Ref Range    Sodium 142 135 - 145 mmol/L    Potassium 4.3 3.4 - 5.3 mmol/L    Chloride 106 98 - 107 mmol/L    Carbon Dioxide (CO2) 25 22 - 29 mmol/L    Anion Gap 11 7 - 15 mmol/L    Urea Nitrogen 23.5 (H) 8.0 - 23.0 mg/dL    Creatinine 1.15 (H) 0.51 - 0.95 mg/dL    GFR Estimate 48 (L) >60 mL/min/1.73m2    Calcium 9.7 8.8 - 10.2 mg/dL    Glucose 97 70 - 99 mg/dL   Troponin T, High Sensitivity     Status: Abnormal   Result Value Ref Range    Troponin T, High Sensitivity 20 (H) <=14 ng/L   CBC with platelets and differential     Status: Abnormal   Result Value Ref Range    WBC Count 8.7 4.0 - 11.0 10e3/uL    RBC Count 3.94 3.80 - 5.20 10e6/uL    Hemoglobin 11.6 (L) 11.7 - 15.7 g/dL    Hematocrit 36.8 35.0 - 47.0 %    MCV 93 78 - 100 fL    MCH 29.4 26.5 - 33.0 pg    MCHC 31.5 31.5 - 36.5 g/dL    RDW 13.8 10.0 - 15.0 %    Platelet Count 299 150 - 450 10e3/uL    % Neutrophils 74 %    % Lymphocytes 14 %    % Monocytes 9 %    % Eosinophils 2 %    % Basophils 1 %    % Immature Granulocytes 0 %    NRBCs per 100 WBC 0 <1 /100    Absolute Neutrophils 6.5 1.6 - 8.3 10e3/uL    Absolute Lymphocytes 1.2 0.8 - 5.3 10e3/uL    Absolute Monocytes 0.7 0.0 - 1.3 10e3/uL    Absolute Eosinophils 0.2 0.0 - 0.7 10e3/uL    Absolute Basophils 0.0 0.0 - 0.2 10e3/uL    Absolute Immature Granulocytes 0.0 <=0.4 10e3/uL    Absolute NRBCs 0.0 10e3/uL   Troponin T, High Sensitivity     Status: Abnormal   Result Value Ref Range    Troponin T, High Sensitivity 20 (H) <=14 ng/L   EKG 12 lead     Status: None   Result Value Ref Range    Systolic Blood Pressure  mmHg    Diastolic Blood Pressure  mmHg    Ventricular Rate 75 BPM    Atrial Rate 75 BPM    OK Interval 134 ms    QRS Duration 70 ms     ms    QTc 426 ms    P Axis 65  degrees    R AXIS 61 degrees    T Axis 63 degrees    Interpretation ECG Sinus rhythm  Normal ECG      CBC with platelets differential     Status: Abnormal    Narrative    The following orders were created for panel order CBC with platelets differential.  Procedure                               Abnormality         Status                     ---------                               -----------         ------                     CBC with platelets and d...[[id number]]  Abnormal            Final result                 Please view results for these tests on the individual orders.     Medications   lidocaine 1% with EPINEPHrine 1:100,000 injection 10 mL (10 mLs Intradermal $Given by Other Clinician 11/29/23 0425)   Tdap (tetanus-diphtheria-acell pertussis) (ADACEL) injection 0.5 mL (0.5 mLs Intramuscular $Given 11/29/23 0414)     Labs Ordered and Resulted from Time of ED Arrival to Time of ED Departure   BASIC METABOLIC PANEL - Abnormal       Result Value    Sodium 142      Potassium 4.3      Chloride 106      Carbon Dioxide (CO2) 25      Anion Gap 11      Urea Nitrogen 23.5 (*)     Creatinine 1.15 (*)     GFR Estimate 48 (*)     Calcium 9.7      Glucose 97     TROPONIN T, HIGH SENSITIVITY - Abnormal    Troponin T, High Sensitivity 20 (*)    CBC WITH PLATELETS AND DIFFERENTIAL - Abnormal    WBC Count 8.7      RBC Count 3.94      Hemoglobin 11.6 (*)     Hematocrit 36.8      MCV 93      MCH 29.4      MCHC 31.5      RDW 13.8      Platelet Count 299      % Neutrophils 74      % Lymphocytes 14      % Monocytes 9      % Eosinophils 2      % Basophils 1      % Immature Granulocytes 0      NRBCs per 100 WBC 0      Absolute Neutrophils 6.5      Absolute Lymphocytes 1.2      Absolute Monocytes 0.7      Absolute Eosinophils 0.2      Absolute Basophils 0.0      Absolute Immature Granulocytes 0.0      Absolute NRBCs 0.0     TROPONIN T, HIGH SENSITIVITY - Abnormal    Troponin T, High Sensitivity 20 (*)      CT Cervical Spine w/o Contrast  "  Final Result   IMPRESSION:   HEAD CT:   1.  Left frontal and periorbital soft tissue swelling. No acute intracranial hemorrhage or calvarial fracture.      2.  Age-related changes.      CERVICAL SPINE CT:   1.  No acute cervical spine fracture.      CT Head w/o Contrast   Final Result   IMPRESSION:   HEAD CT:   1.  Left frontal and periorbital soft tissue swelling. No acute intracranial hemorrhage or calvarial fracture.      2.  Age-related changes.      CERVICAL SPINE CT:   1.  No acute cervical spine fracture.             Critical care was not performed.     Medical Decision Making  The patient's presentation was of high complexity (an acute health issue posing potential threat to life or bodily function).    The patient's evaluation involved:  ordering and/or review of 3+ test(s) in this encounter (see separate area of note for details)    The patient's management necessitated moderate risk (a decision regarding minor procedure (laceration repair) with risk factors of high risk features of the procedure site ).    Assessment & Plan    Patient presents the ED for evaluation after a ground-level fall.  She was flung forward off of her recliner and hit her head on the corner of a table.  Denies loss of consciousness.  No neck pain.  Has hematoma with laceration on her left forehead.  She states \"it bled for an hour before coming in\".  Denies any preceding syncope, seizure activity, or anything prior to her fall.    On arrival, she is significantly hypertensive at 222/97.  States that she took her usual blood pressure medicine (losartan and co-Q10) this morning.  No chest pain, shortness of breath, or vision change.  Physical exam without evidence of fluid overload.    She does have a 5 cm hematoma on the left forehead with a 1.5 cm laceration.  This was repaired using 4 nonabsorbable sutures to be removed in the next 5 to 7 days.    Plan for CT head and C-spine.  Will sign out to morning provider plan to follow-up " on imaging and disposition accordingly.      I have reviewed the nursing notes. I have reviewed the findings, diagnosis, plan and need for follow up with the patient.    Discharge Medication List as of 11/29/2023  6:47 AM          Final diagnoses:   Fall, initial encounter   Facial laceration, initial encounter       Benitez Lay DO  [facility] EMERGENCY DEPARTMENT  11/29/2023     Benitez Lay DO  11/29/23 0372

## 2023-11-29 NOTE — DISCHARGE INSTRUCTIONS
Follow up with your clinic doctor in 5 days for suture removal and recheck. Return with any new or worsening symptoms or any other concerns.

## 2023-11-30 ENCOUNTER — TELEPHONE (OUTPATIENT)
Dept: FAMILY MEDICINE | Facility: CLINIC | Age: 83
End: 2023-11-30
Payer: COMMERCIAL

## 2023-11-30 ENCOUNTER — PATIENT OUTREACH (OUTPATIENT)
Dept: CARE COORDINATION | Facility: CLINIC | Age: 83
End: 2023-11-30
Payer: COMMERCIAL

## 2023-11-30 NOTE — PROGRESS NOTES
Clinic Care Coordination Contact  Community Health Worker Initial Outreach    CHW Initial Information Gathering:  Referral Source: ED Follow-Up  Preferred Hospital: Other ([facility] EMERGENCY DEPARTMENT)  Preferred Urgent Care: [facility], [phone]  No PCP office visit in Past Year: No       Patient accepts CC: No, due to the patient stating that at this time there are no concerns or questions for CCC to assist with.     MALIA BenjaminW  [phone]  [facility] Care Resource [facility]

## 2023-11-30 NOTE — TELEPHONE ENCOUNTER
Patient called to request an appt with pcp/other provider to remove stitches in head seen in ER yesterday for head laceration.  A future appt scheduled for 12/6/23 per timeline to remove stitches scheduled with date, time and location provided to patient.    JOSE ANTONIO SchwartzN, RN  [facility]

## 2023-12-06 ENCOUNTER — OFFICE VISIT (OUTPATIENT)
Dept: FAMILY MEDICINE | Facility: CLINIC | Age: 83
End: 2023-12-06
Payer: COMMERCIAL

## 2023-12-06 VITALS
RESPIRATION RATE: 18 BRPM | OXYGEN SATURATION: 100 % | HEIGHT: 64 IN | SYSTOLIC BLOOD PRESSURE: 130 MMHG | WEIGHT: 162.01 LBS | DIASTOLIC BLOOD PRESSURE: 80 MMHG | TEMPERATURE: 98.2 F | HEART RATE: 70 BPM | BODY MASS INDEX: 27.66 KG/M2

## 2023-12-06 DIAGNOSIS — I10 BENIGN ESSENTIAL HYPERTENSION: ICD-10-CM

## 2023-12-06 DIAGNOSIS — Z48.02 ENCOUNTER FOR REMOVAL OF SUTURES: Primary | ICD-10-CM

## 2023-12-06 PROCEDURE — 99214 OFFICE O/P EST MOD 30 MIN: CPT | Performed by: NURSE PRACTITIONER

## 2023-12-06 RX ORDER — LOSARTAN POTASSIUM 50 MG/1
50 TABLET ORAL DAILY
Qty: 90 TABLET | Refills: 1 | Status: SHIPPED | OUTPATIENT
Start: 2023-12-06 | End: 2024-07-18

## 2023-12-06 ASSESSMENT — PAIN SCALES - GENERAL: PAINLEVEL: NO PAIN (0)

## 2023-12-06 NOTE — PROGRESS NOTES
"  Assessment & Plan     (Z48.02) Encounter for removal of sutures  (primary encounter diagnosis)  Comment:   Plan: Sutures removed without difficulty.     (I10) Benign essential hypertension  Comment: at goal according to home readings  Plan: losartan (COZAAR) 50 MG tablet        The current medical regimen is effective;  continue present plan and medications.       I spent a total of 35 minutes on the day of the visit.   Time spent by me doing chart review, history and exam, documentation and further activities per the note     MED REC REQUIRED  Post Medication Reconciliation Status: discharge medications reconciled and changed, per note/orders  BMI:   Estimated body mass index is 27.8 kg/m  as calculated from the following:    Height as of this encounter: 1.626 m (5' 4.02\").    Weight as of this encounter: 73.5 kg (162 lb 0.2 oz).           Gloria Akbar NP  [facility]    Long Schmidt is a 83 year old, presenting for the following health issues:  Hospital F/U        12/6/2023     4:14 PM   Additional Questions   Roomed by Kristi       [facility] Follow-up Visit:    Hospital/Nursing Home/IP Rehab Facility: [facility]  Date of Admission: 11/28/2023  Date of Discharge: 11/29/2023  Reason(s) for Admission: Fell and hit her head on the corner of tv     Was your hospitalization related to COVID-19? No   Problems taking medications regularly:  None  Medication changes since discharge: None  Problems adhering to non-medication therapy:  None    Summary of hospitalization:  [facility] discharge summary reviewed  Diagnostic Tests/Treatments reviewed.  Follow up needed: none  Other Healthcare Providers Involved in Patient s Care:         None  Update since discharge: improved.         Plan of care communicated with patient           Blood pressure at home has been around 130/80.            Review of Systems       " "  Objective    BP (!) 188/80 (BP Location: Right arm, Patient Position: Sitting, Cuff Size: Adult Regular)   Pulse 70   Temp 98.2  F (36.8  C) (Temporal)   Resp 18   Ht 1.626 m (5' 4.02\")   Wt 73.5 kg (162 lb 0.2 oz)   SpO2 100%   Breastfeeding No   BMI 27.80 kg/m    Body mass index is 27.8 kg/m .  Physical Exam   GENERAL: healthy, alert and no distress  SKIN: well-healed laceration above left eyebrow without erythema  PSYCH: mentation appears normal, affect normal/bright                      "

## 2024-07-03 ENCOUNTER — TRANSFERRED RECORDS (OUTPATIENT)
Dept: HEALTH INFORMATION MANAGEMENT | Facility: CLINIC | Age: 84
End: 2024-07-03
Payer: COMMERCIAL

## 2024-08-09 ENCOUNTER — TRANSFERRED RECORDS (OUTPATIENT)
Dept: HEALTH INFORMATION MANAGEMENT | Facility: CLINIC | Age: 84
End: 2024-08-09
Payer: COMMERCIAL

## 2024-10-14 DIAGNOSIS — I10 BENIGN ESSENTIAL HYPERTENSION: ICD-10-CM

## 2024-10-14 RX ORDER — LOSARTAN POTASSIUM 50 MG/1
50 TABLET ORAL DAILY
Qty: 90 TABLET | Refills: 0 | Status: SHIPPED | OUTPATIENT
Start: 2024-10-14

## 2025-01-21 DIAGNOSIS — I10 BENIGN ESSENTIAL HYPERTENSION: ICD-10-CM

## 2025-01-21 RX ORDER — LOSARTAN POTASSIUM 50 MG/1
50 TABLET ORAL DAILY
Qty: 90 TABLET | Refills: 0 | Status: SHIPPED | OUTPATIENT
Start: 2025-01-21

## 2025-06-12 ENCOUNTER — TELEPHONE (OUTPATIENT)
Dept: FAMILY MEDICINE | Facility: CLINIC | Age: 85
End: 2025-06-12
Payer: COMMERCIAL

## 2025-06-12 NOTE — TELEPHONE ENCOUNTER
Writer was on phone with patient regarding spouse and patient requested message be sent to clinician that she is unable to come in for a visit any time soon, she is unsure when she will be able to complete a visit, but she does need continuation of medication.   Patient states clinician is her spouses favorite and she thinks highly of her as well, wanted that to be shared.

## 2025-08-11 DIAGNOSIS — I10 BENIGN ESSENTIAL HYPERTENSION: ICD-10-CM

## 2025-08-11 RX ORDER — LOSARTAN POTASSIUM 50 MG/1
50 TABLET ORAL DAILY
Qty: 90 TABLET | Refills: 0 | Status: SHIPPED | OUTPATIENT
Start: 2025-08-11